# Patient Record
Sex: FEMALE | Race: ASIAN | Employment: STUDENT | URBAN - METROPOLITAN AREA
[De-identification: names, ages, dates, MRNs, and addresses within clinical notes are randomized per-mention and may not be internally consistent; named-entity substitution may affect disease eponyms.]

---
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
## 2021-02-08 PROBLEM — F41.1 GENERALIZED ANXIETY DISORDER: Status: ACTIVE | Noted: 2021-02-08

## 2021-08-05 ENCOUNTER — HOSPITAL ENCOUNTER (EMERGENCY)
Facility: HOSPITAL | Age: 21
Discharge: HOME | End: 2021-08-06
Attending: EMERGENCY MEDICINE
Payer: COMMERCIAL

## 2021-08-05 ENCOUNTER — APPOINTMENT (OUTPATIENT)
Dept: ULTRASOUND IMAGING | Facility: HOSPITAL | Age: 21
End: 2021-08-05
Attending: EMERGENCY MEDICINE
Payer: COMMERCIAL

## 2021-08-05 DIAGNOSIS — N93.8 DYSFUNCTIONAL UTERINE BLEEDING: Primary | ICD-10-CM

## 2021-08-05 LAB
ALBUMIN SERPL-MCNC: 4 G/DL (ref 3.4–5)
ALBUMIN/GLOB SERPL: 1.2 {RATIO} (ref 1–2)
ALP LIVER SERPL-CCNC: 56 U/L
ALT SERPL-CCNC: 25 U/L
ANION GAP SERPL CALC-SCNC: 7 MMOL/L (ref 0–18)
APTT PPP: 24.4 SECONDS (ref 25.4–36.1)
AST SERPL-CCNC: 15 U/L (ref 15–37)
B-HCG UR QL: NEGATIVE
BASOPHILS # BLD AUTO: 0.02 X10(3) UL (ref 0–0.2)
BASOPHILS NFR BLD AUTO: 0.3 %
BILIRUB SERPL-MCNC: 0.4 MG/DL (ref 0.1–2)
BILIRUB UR QL STRIP.AUTO: NEGATIVE
BUN BLD-MCNC: 5 MG/DL (ref 7–18)
CALCIUM BLD-MCNC: 8.9 MG/DL (ref 8.5–10.1)
CHLORIDE SERPL-SCNC: 106 MMOL/L (ref 98–112)
CLARITY UR REFRACT.AUTO: CLEAR
CO2 SERPL-SCNC: 23 MMOL/L (ref 21–32)
COLOR UR AUTO: YELLOW
CREAT BLD-MCNC: 0.71 MG/DL
EOSINOPHIL # BLD AUTO: 0.02 X10(3) UL (ref 0–0.7)
EOSINOPHIL NFR BLD AUTO: 0.3 %
ERYTHROCYTE [DISTWIDTH] IN BLOOD BY AUTOMATED COUNT: 12.8 %
GLOBULIN PLAS-MCNC: 3.3 G/DL (ref 2.8–4.4)
GLUCOSE BLD-MCNC: 73 MG/DL (ref 70–99)
GLUCOSE UR STRIP.AUTO-MCNC: NEGATIVE MG/DL
HCT VFR BLD AUTO: 37.5 %
HGB BLD-MCNC: 12.5 G/DL
HGB RETIC QN AUTO: 33.2 PG (ref 28.2–36.6)
IMM GRANULOCYTES # BLD AUTO: 0.01 X10(3) UL (ref 0–1)
IMM GRANULOCYTES NFR BLD: 0.2 %
IMM RETICS NFR: 0.04 RATIO (ref 0.1–0.3)
INR BLD: 1.08 (ref 0.89–1.11)
IRON SATURATION: 12 %
IRON SERPL-MCNC: 48 UG/DL
KETONES UR STRIP.AUTO-MCNC: 20 MG/DL
LEUKOCYTE ESTERASE UR QL STRIP.AUTO: NEGATIVE
LYMPHOCYTES # BLD AUTO: 3.25 X10(3) UL (ref 1–4)
LYMPHOCYTES NFR BLD AUTO: 56.3 %
M PROTEIN MFR SERPL ELPH: 7.3 G/DL (ref 6.4–8.2)
MCH RBC QN AUTO: 27.4 PG (ref 26–34)
MCHC RBC AUTO-ENTMCNC: 33.3 G/DL (ref 31–37)
MCV RBC AUTO: 82.2 FL
MONOCYTES # BLD AUTO: 0.54 X10(3) UL (ref 0.1–1)
MONOCYTES NFR BLD AUTO: 9.4 %
NEUTROPHILS # BLD AUTO: 1.93 X10 (3) UL (ref 1.5–7.7)
NEUTROPHILS # BLD AUTO: 1.93 X10(3) UL (ref 1.5–7.7)
NEUTROPHILS NFR BLD AUTO: 33.5 %
NITRITE UR QL STRIP.AUTO: NEGATIVE
OSMOLALITY SERPL CALC.SUM OF ELEC: 278 MOSM/KG (ref 275–295)
PH UR STRIP.AUTO: 6 [PH] (ref 5–8)
PLATELET # BLD AUTO: 252 10(3)UL (ref 150–450)
POTASSIUM SERPL-SCNC: 3.4 MMOL/L (ref 3.5–5.1)
PROT UR STRIP.AUTO-MCNC: NEGATIVE MG/DL
PSA SERPL DL<=0.01 NG/ML-MCNC: 14.2 SECONDS (ref 12.2–14.5)
RBC # BLD AUTO: 4.56 X10(6)UL
RETICS # AUTO: 39.7 X10(3) UL (ref 22.5–147.5)
RETICS/RBC NFR AUTO: 0.9 %
SODIUM SERPL-SCNC: 136 MMOL/L (ref 136–145)
SP GR UR STRIP.AUTO: 1.01 (ref 1–1.03)
TOTAL IRON BINDING CAPACITY: 417 UG/DL (ref 240–450)
TRANSFERRIN SERPL-MCNC: 280 MG/DL (ref 200–360)
UROBILINOGEN UR STRIP.AUTO-MCNC: <2 MG/DL
WBC # BLD AUTO: 5.8 X10(3) UL (ref 4–11)

## 2021-08-05 PROCEDURE — 99284 EMERGENCY DEPT VISIT MOD MDM: CPT

## 2021-08-05 PROCEDURE — 83550 IRON BINDING TEST: CPT | Performed by: EMERGENCY MEDICINE

## 2021-08-05 PROCEDURE — 81001 URINALYSIS AUTO W/SCOPE: CPT | Performed by: EMERGENCY MEDICINE

## 2021-08-05 PROCEDURE — 81025 URINE PREGNANCY TEST: CPT

## 2021-08-05 PROCEDURE — 96360 HYDRATION IV INFUSION INIT: CPT

## 2021-08-05 PROCEDURE — 85730 THROMBOPLASTIN TIME PARTIAL: CPT | Performed by: EMERGENCY MEDICINE

## 2021-08-05 PROCEDURE — 80053 COMPREHEN METABOLIC PANEL: CPT | Performed by: EMERGENCY MEDICINE

## 2021-08-05 PROCEDURE — 83540 ASSAY OF IRON: CPT | Performed by: EMERGENCY MEDICINE

## 2021-08-05 PROCEDURE — 85045 AUTOMATED RETICULOCYTE COUNT: CPT | Performed by: EMERGENCY MEDICINE

## 2021-08-05 PROCEDURE — 85610 PROTHROMBIN TIME: CPT | Performed by: EMERGENCY MEDICINE

## 2021-08-05 PROCEDURE — 85025 COMPLETE CBC W/AUTO DIFF WBC: CPT | Performed by: EMERGENCY MEDICINE

## 2021-08-05 RX ORDER — MELATONIN
325
Qty: 60 TABLET | Refills: 0 | Status: SHIPPED | OUTPATIENT
Start: 2021-08-05

## 2021-08-06 VITALS
SYSTOLIC BLOOD PRESSURE: 107 MMHG | RESPIRATION RATE: 16 BRPM | HEIGHT: 64 IN | OXYGEN SATURATION: 99 % | TEMPERATURE: 98 F | BODY MASS INDEX: 21 KG/M2 | DIASTOLIC BLOOD PRESSURE: 74 MMHG | HEART RATE: 66 BPM

## 2021-08-06 NOTE — ED PROVIDER NOTES
Patient Seen in: [facility] Emergency Department      History   Patient presents with:  Abdomen/Flank Pain    Stated Complaint: WEAKNESS, FATIGUE, MENSES X 1 MONTH    HPI/Subjective:   HPI    Patient is a 49-year-old says she has had daily vaginal b (Temporal)   Resp 16   Ht 162.6 cm (5' 4\")   LMP 06/27/2021   SpO2 99%   BMI 21.46 kg/m²         Physical Exam  GENERAL: Patient is awake, alert, active and interactive. HEENT: Head is normocephalic and atraumatic. Conjunctiva are clear.   No photophobia orders were created for panel order CBC With Differential With Platelet.   Procedure                               Abnormality         Status                     ---------                               -----------         ------                     CBC W/ D

## 2021-08-06 NOTE — ED INITIAL ASSESSMENT (HPI)
Switched birth control a month ago, has been on period for the last mo, fatigue/nausea/dizzy for the last week  Soaking approx 3 pads daily

## 2023-04-27 LAB — CYTOLOGY CVX/VAG DOC THIN PREP: NORMAL

## 2024-02-05 ENCOUNTER — TELEPHONE (OUTPATIENT)
Dept: BEHAVIORAL HEALTH | Age: 24
End: 2024-02-05

## 2024-02-09 ENCOUNTER — TELEPHONE (OUTPATIENT)
Dept: BEHAVIORAL HEALTH | Age: 24
End: 2024-02-09

## 2024-02-09 ENCOUNTER — E-ADVICE (OUTPATIENT)
Dept: BEHAVIORAL HEALTH | Age: 24
End: 2024-02-09

## 2024-02-09 ENCOUNTER — BEHAVIORAL HEALTH (OUTPATIENT)
Dept: BEHAVIORAL HEALTH | Age: 24
End: 2024-02-09

## 2024-02-09 DIAGNOSIS — Z86.59 HISTORY OF SUICIDAL IDEATION: ICD-10-CM

## 2024-02-09 DIAGNOSIS — R45.4 IRRITABILITY: ICD-10-CM

## 2024-02-09 DIAGNOSIS — Z91.52 HISTORY OF NON-SUICIDAL SELF-HARM: ICD-10-CM

## 2024-02-09 DIAGNOSIS — G47.11 IDIOPATHIC HYPERSOMNIA: ICD-10-CM

## 2024-02-09 DIAGNOSIS — F33.1 MODERATE EPISODE OF RECURRENT MAJOR DEPRESSIVE DISORDER (CMD): Primary | ICD-10-CM

## 2024-02-09 DIAGNOSIS — F41.1 GENERALIZED ANXIETY DISORDER: ICD-10-CM

## 2024-02-09 DIAGNOSIS — F40.10 SOCIAL ANXIETY DISORDER: ICD-10-CM

## 2024-02-09 DIAGNOSIS — F42.2 MIXED OBSESSIONAL THOUGHTS AND ACTS: ICD-10-CM

## 2024-02-09 DIAGNOSIS — F43.10 PTSD (POST-TRAUMATIC STRESS DISORDER): ICD-10-CM

## 2024-02-09 DIAGNOSIS — F39 MOOD DISORDER (CMD): ICD-10-CM

## 2024-02-09 RX ORDER — MODAFINIL 200 MG/1
200 TABLET ORAL DAILY
COMMUNITY
End: 2024-02-09 | Stop reason: SDUPTHER

## 2024-02-09 RX ORDER — MODAFINIL 200 MG/1
200 TABLET ORAL DAILY
Qty: 30 TABLET | Refills: 0 | Status: SHIPPED | OUTPATIENT
Start: 2024-02-09

## 2024-02-09 RX ORDER — FLUVOXAMINE MALEATE 50 MG/1
50 TABLET, COATED ORAL NIGHTLY
COMMUNITY

## 2024-02-26 ENCOUNTER — APPOINTMENT (OUTPATIENT)
Dept: BEHAVIORAL HEALTH | Age: 24
End: 2024-02-26

## 2024-02-27 ENCOUNTER — APPOINTMENT (OUTPATIENT)
Dept: BEHAVIORAL HEALTH | Age: 24
End: 2024-02-27

## 2024-02-27 DIAGNOSIS — F39 MOOD DISORDER (CMD): ICD-10-CM

## 2024-02-27 DIAGNOSIS — F43.10 PTSD (POST-TRAUMATIC STRESS DISORDER): ICD-10-CM

## 2024-02-27 DIAGNOSIS — F42.2 MIXED OBSESSIONAL THOUGHTS AND ACTS: ICD-10-CM

## 2024-02-27 DIAGNOSIS — F41.1 GENERALIZED ANXIETY DISORDER: Primary | ICD-10-CM

## 2024-02-27 DIAGNOSIS — F40.10 SOCIAL ANXIETY DISORDER: ICD-10-CM

## 2024-02-27 DIAGNOSIS — F33.1 MODERATE EPISODE OF RECURRENT MAJOR DEPRESSIVE DISORDER (CMD): ICD-10-CM

## 2024-02-27 DIAGNOSIS — G47.11 IDIOPATHIC HYPERSOMNIA: ICD-10-CM

## 2024-03-27 ENCOUNTER — APPOINTMENT (OUTPATIENT)
Dept: BEHAVIORAL HEALTH | Age: 24
End: 2024-03-27

## 2024-04-04 ENCOUNTER — LAB SERVICES (OUTPATIENT)
Dept: LAB | Age: 24
End: 2024-04-04

## 2024-04-04 ENCOUNTER — APPOINTMENT (OUTPATIENT)
Dept: INTERNAL MEDICINE | Age: 24
End: 2024-04-04

## 2024-04-04 VITALS
HEIGHT: 63 IN | RESPIRATION RATE: 16 BRPM | HEART RATE: 95 BPM | SYSTOLIC BLOOD PRESSURE: 118 MMHG | OXYGEN SATURATION: 98 % | TEMPERATURE: 98.1 F | BODY MASS INDEX: 19.84 KG/M2 | DIASTOLIC BLOOD PRESSURE: 74 MMHG | WEIGHT: 112 LBS

## 2024-04-04 DIAGNOSIS — M25.50 POLYARTHRALGIA: ICD-10-CM

## 2024-04-04 DIAGNOSIS — Z00.00 ANNUAL PHYSICAL EXAM: ICD-10-CM

## 2024-04-04 DIAGNOSIS — Z00.00 ANNUAL PHYSICAL EXAM: Primary | ICD-10-CM

## 2024-04-04 LAB
25(OH)D3+25(OH)D2 SERPL-MCNC: 20.9 NG/ML (ref 30–100)
ALBUMIN SERPL-MCNC: 4.1 G/DL (ref 3.6–5.1)
ALBUMIN/GLOB SERPL: 1.2 {RATIO} (ref 1–2.4)
ALP SERPL-CCNC: 57 UNITS/L (ref 45–117)
ALT SERPL-CCNC: 17 UNITS/L
ANION GAP SERPL CALC-SCNC: 14 MMOL/L (ref 7–19)
AST SERPL-CCNC: 15 UNITS/L
BASOPHILS # BLD: 0 K/MCL (ref 0–0.3)
BASOPHILS NFR BLD: 0 %
BILIRUB SERPL-MCNC: 0.4 MG/DL (ref 0.2–1)
BUN SERPL-MCNC: 12 MG/DL (ref 6–20)
BUN/CREAT SERPL: 18 (ref 7–25)
CALCIUM SERPL-MCNC: 9.3 MG/DL (ref 8.4–10.2)
CHLORIDE SERPL-SCNC: 102 MMOL/L (ref 97–110)
CHOLEST SERPL-MCNC: 151 MG/DL
CHOLEST/HDLC SERPL: 1.8 {RATIO}
CO2 SERPL-SCNC: 26 MMOL/L (ref 21–32)
CREAT SERPL-MCNC: 0.65 MG/DL (ref 0.51–0.95)
DEPRECATED RDW RBC: 42.2 FL (ref 39–50)
EGFRCR SERPLBLD CKD-EPI 2021: >90 ML/MIN/{1.73_M2}
EOSINOPHIL # BLD: 0 K/MCL (ref 0–0.5)
EOSINOPHIL NFR BLD: 0 %
ERYTHROCYTE [DISTWIDTH] IN BLOOD: 13.5 % (ref 11–15)
ERYTHROCYTE [SEDIMENTATION RATE] IN BLOOD BY WESTERGREN METHOD: 5 MM/HR (ref 0–20)
FASTING DURATION TIME PATIENT: NORMAL H
GLOBULIN SER-MCNC: 3.5 G/DL (ref 2–4)
GLUCOSE SERPL-MCNC: 72 MG/DL (ref 70–99)
HCT VFR BLD CALC: 40.8 % (ref 36–46.5)
HDLC SERPL-MCNC: 82 MG/DL
HGB BLD-MCNC: 13.3 G/DL (ref 12–15.5)
IMM GRANULOCYTES # BLD AUTO: 0 K/MCL (ref 0–0.2)
IMM GRANULOCYTES # BLD: 0 %
LDLC SERPL CALC-MCNC: 58 MG/DL
LYMPHOCYTES # BLD: 2.8 K/MCL (ref 1–4.8)
LYMPHOCYTES NFR BLD: 39 %
MCH RBC QN AUTO: 28.2 PG (ref 26–34)
MCHC RBC AUTO-ENTMCNC: 32.6 G/DL (ref 32–36.5)
MCV RBC AUTO: 86.4 FL (ref 78–100)
MONOCYTES # BLD: 0.6 K/MCL (ref 0.3–0.9)
MONOCYTES NFR BLD: 9 %
NEUTROPHILS # BLD: 3.7 K/MCL (ref 1.8–7.7)
NEUTROPHILS NFR BLD: 52 %
NONHDLC SERPL-MCNC: 69 MG/DL
NRBC BLD MANUAL-RTO: 0 /100 WBC
PLATELET # BLD AUTO: 316 K/MCL (ref 140–450)
POTASSIUM SERPL-SCNC: 4.2 MMOL/L (ref 3.4–5.1)
PROT SERPL-MCNC: 7.6 G/DL (ref 6.4–8.2)
RBC # BLD: 4.72 MIL/MCL (ref 4–5.2)
RHEUMATOID FACT SER NEPH-ACNC: <10 UNITS/ML
SODIUM SERPL-SCNC: 138 MMOL/L (ref 135–145)
TRIGL SERPL-MCNC: 56 MG/DL
TSH SERPL-ACNC: 1.2 MCUNITS/ML (ref 0.35–5)
WBC # BLD: 7.1 K/MCL (ref 4.2–11)

## 2024-04-04 PROCEDURE — 85652 RBC SED RATE AUTOMATED: CPT | Performed by: INTERNAL MEDICINE

## 2024-04-04 PROCEDURE — 36415 COLL VENOUS BLD VENIPUNCTURE: CPT | Performed by: INTERNAL MEDICINE

## 2024-04-04 PROCEDURE — 80050 GENERAL HEALTH PANEL: CPT | Performed by: INTERNAL MEDICINE

## 2024-04-04 PROCEDURE — 80061 LIPID PANEL: CPT | Performed by: INTERNAL MEDICINE

## 2024-04-04 PROCEDURE — 82306 VITAMIN D 25 HYDROXY: CPT | Performed by: INTERNAL MEDICINE

## 2024-04-04 PROCEDURE — 86431 RHEUMATOID FACTOR QUANT: CPT | Performed by: CLINICAL MEDICAL LABORATORY

## 2024-04-04 PROCEDURE — 86038 ANTINUCLEAR ANTIBODIES: CPT | Performed by: CLINICAL MEDICAL LABORATORY

## 2024-04-04 PROCEDURE — 86200 CCP ANTIBODY: CPT | Performed by: CLINICAL MEDICAL LABORATORY

## 2024-04-04 RX ORDER — PROPRANOLOL HYDROCHLORIDE 10 MG/1
TABLET ORAL
COMMUNITY

## 2024-04-04 RX ORDER — DESOGESTREL AND ETHINYL ESTRADIOL 0.15-0.03
1 KIT ORAL DAILY
COMMUNITY
Start: 2023-08-02 | End: 2024-10-25

## 2024-04-04 SDOH — HEALTH STABILITY: PHYSICAL HEALTH: ON AVERAGE, HOW MANY DAYS PER WEEK DO YOU ENGAGE IN MODERATE TO STRENUOUS EXERCISE (LIKE A BRISK WALK)?: 0 DAYS

## 2024-04-04 SDOH — HEALTH STABILITY: PHYSICAL HEALTH: ON AVERAGE, HOW MANY MINUTES DO YOU ENGAGE IN EXERCISE AT THIS LEVEL?: 0 MIN

## 2024-04-04 ASSESSMENT — PATIENT HEALTH QUESTIONNAIRE - PHQ9
SUM OF ALL RESPONSES TO PHQ9 QUESTIONS 1 AND 2: 4
1. LITTLE INTEREST OR PLEASURE IN DOING THINGS: MORE THAN HALF THE DAYS
CLINICAL INTERPRETATION OF PHQ9 SCORE: MODERATE DEPRESSION
SUM OF ALL RESPONSES TO PHQ QUESTIONS 1-9: 13
CLINICAL INTERPRETATION OF PHQ2 SCORE: FURTHER SCREENING NEEDED
2. FEELING DOWN, DEPRESSED OR HOPELESS: MORE THAN HALF THE DAYS
8. MOVING OR SPEAKING SO SLOWLY THAT OTHER PEOPLE COULD HAVE NOTICED. OR THE OPPOSITE, BEING SO FIGETY OR RESTLESS THAT YOU HAVE BEEN MOVING AROUND A LOT MORE THAN USUAL: NOT AT ALL
6. FEELING BAD ABOUT YOURSELF - OR THAT YOU ARE A FAILURE OR HAVE LET YOURSELF OR YOUR FAMILY DOWN: SEVERAL DAYS
3. TROUBLE FALLING OR STAYING ASLEEP OR SLEEPING TOO MUCH: SEVERAL DAYS
10. IF YOU CHECKED OFF ANY PROBLEMS, HOW DIFFICULT HAVE THESE PROBLEMS MADE IT FOR YOU TO DO YOUR WORK, TAKE CARE OF THINGS AT HOME, OR GET ALONG WITH OTHER PEOPLE: VERY DIFFICULT
7. TROUBLE CONCENTRATING ON THINGS, SUCH AS READING THE NEWSPAPER OR WATCHING TELEVISION: SEVERAL DAYS
4. FEELING TIRED OR HAVING LITTLE ENERGY: NEARLY EVERY DAY
5. POOR APPETITE, WEIGHT LOSS, OR OVEREATING: MORE THAN HALF THE DAYS
9. THOUGHTS THAT YOU WOULD BE BETTER OFF DEAD, OR OF HURTING YOURSELF: SEVERAL DAYS
SUM OF ALL RESPONSES TO PHQ9 QUESTIONS 1 AND 2: 4

## 2024-04-05 ENCOUNTER — E-ADVICE (OUTPATIENT)
Dept: INTERNAL MEDICINE | Age: 24
End: 2024-04-05

## 2024-04-05 DIAGNOSIS — E55.9 VITAMIN D DEFICIENCY: Primary | ICD-10-CM

## 2024-04-05 LAB
ANA SER QL IA: NEGATIVE
CCP AB SER IA-ACNC: 3 UNITS

## 2024-04-05 RX ORDER — ERGOCALCIFEROL 1.25 MG/1
1.25 CAPSULE ORAL
Qty: 12 CAPSULE | Refills: 0 | Status: SHIPPED | OUTPATIENT
Start: 2024-04-08 | End: 2024-07-01

## 2024-04-17 ENCOUNTER — TELEPHONE (OUTPATIENT)
Dept: BEHAVIORAL HEALTH | Age: 24
End: 2024-04-17

## 2024-04-17 ENCOUNTER — BEHAVIORAL HEALTH (OUTPATIENT)
Dept: BEHAVIORAL HEALTH | Age: 24
End: 2024-04-17

## 2024-04-17 ENCOUNTER — E-ADVICE (OUTPATIENT)
Dept: BEHAVIORAL HEALTH | Age: 24
End: 2024-04-17

## 2024-04-17 DIAGNOSIS — F39 MOOD DISORDER (CMD): ICD-10-CM

## 2024-04-17 DIAGNOSIS — F40.10 SOCIAL ANXIETY DISORDER: ICD-10-CM

## 2024-04-17 DIAGNOSIS — R45.4 IRRITABILITY: ICD-10-CM

## 2024-04-17 DIAGNOSIS — G47.11 IDIOPATHIC HYPERSOMNIA: ICD-10-CM

## 2024-04-17 DIAGNOSIS — F42.2 MIXED OBSESSIONAL THOUGHTS AND ACTS: Primary | ICD-10-CM

## 2024-04-17 DIAGNOSIS — F41.1 GENERALIZED ANXIETY DISORDER: ICD-10-CM

## 2024-04-17 DIAGNOSIS — F33.1 MODERATE EPISODE OF RECURRENT MAJOR DEPRESSIVE DISORDER (CMD): ICD-10-CM

## 2024-04-17 RX ORDER — MODAFINIL 200 MG/1
200 TABLET ORAL DAILY
Qty: 30 TABLET | Refills: 0 | Status: SHIPPED | OUTPATIENT
Start: 2024-04-17

## 2024-04-17 RX ORDER — FLUVOXAMINE MALEATE 25 MG
25 TABLET ORAL EVERY MORNING
Qty: 30 TABLET | Refills: 1 | Status: SHIPPED | OUTPATIENT
Start: 2024-04-17

## 2024-04-17 RX ORDER — FLUVOXAMINE MALEATE 50 MG/1
50 TABLET, COATED ORAL NIGHTLY
OUTPATIENT
Start: 2024-04-17

## 2024-04-17 RX ORDER — MODAFINIL 200 MG/1
200 TABLET ORAL DAILY
Qty: 30 TABLET | Refills: 0 | OUTPATIENT
Start: 2024-04-17

## 2024-04-17 RX ORDER — GABAPENTIN 400 MG/1
400 CAPSULE ORAL 3 TIMES DAILY
COMMUNITY

## 2024-04-17 RX ORDER — FLUVOXAMINE MALEATE 50 MG/1
50 TABLET, COATED ORAL NIGHTLY
Qty: 30 TABLET | Refills: 1 | Status: SHIPPED | OUTPATIENT
Start: 2024-04-17

## 2024-05-01 ENCOUNTER — APPOINTMENT (OUTPATIENT)
Dept: BEHAVIORAL HEALTH | Age: 24
End: 2024-05-01

## 2024-05-14 ENCOUNTER — TELEPHONE (OUTPATIENT)
Dept: BEHAVIORAL HEALTH | Age: 24
End: 2024-05-14

## 2024-05-23 DIAGNOSIS — F33.1 MODERATE EPISODE OF RECURRENT MAJOR DEPRESSIVE DISORDER  (CMD): ICD-10-CM

## 2024-05-23 DIAGNOSIS — F42.2 MIXED OBSESSIONAL THOUGHTS AND ACTS: ICD-10-CM

## 2024-05-23 DIAGNOSIS — F41.1 GENERALIZED ANXIETY DISORDER: ICD-10-CM

## 2024-05-23 DIAGNOSIS — G47.11 IDIOPATHIC HYPERSOMNIA: ICD-10-CM

## 2024-05-23 RX ORDER — FLUVOXAMINE MALEATE 25 MG
25 TABLET ORAL EVERY MORNING
Qty: 30 TABLET | Refills: 1 | Status: CANCELLED | OUTPATIENT
Start: 2024-05-23

## 2024-05-23 RX ORDER — MODAFINIL 200 MG/1
200 TABLET ORAL DAILY
Qty: 30 TABLET | Refills: 0 | Status: SHIPPED | OUTPATIENT
Start: 2024-05-23

## 2024-06-12 ENCOUNTER — E-ADVICE (OUTPATIENT)
Dept: BEHAVIORAL HEALTH | Age: 24
End: 2024-06-12

## 2024-06-12 ENCOUNTER — APPOINTMENT (OUTPATIENT)
Dept: BEHAVIORAL HEALTH | Age: 24
End: 2024-06-12

## 2024-06-12 DIAGNOSIS — F42.2 MIXED OBSESSIONAL THOUGHTS AND ACTS: Primary | ICD-10-CM

## 2024-06-12 DIAGNOSIS — F41.1 GENERALIZED ANXIETY DISORDER: ICD-10-CM

## 2024-06-12 DIAGNOSIS — F33.1 MODERATE EPISODE OF RECURRENT MAJOR DEPRESSIVE DISORDER  (CMD): ICD-10-CM

## 2024-06-12 DIAGNOSIS — F40.10 SOCIAL ANXIETY DISORDER: ICD-10-CM

## 2024-06-12 DIAGNOSIS — F43.10 PTSD (POST-TRAUMATIC STRESS DISORDER): ICD-10-CM

## 2024-06-12 DIAGNOSIS — G47.11 IDIOPATHIC HYPERSOMNIA: ICD-10-CM

## 2024-06-12 RX ORDER — MODAFINIL 200 MG/1
200 TABLET ORAL DAILY
Qty: 30 TABLET | Refills: 0 | Status: SHIPPED | OUTPATIENT
Start: 2024-06-21

## 2024-06-12 RX ORDER — FLUVOXAMINE MALEATE 50 MG/1
50 TABLET, COATED ORAL NIGHTLY
Qty: 90 TABLET | Refills: 0 | Status: SHIPPED | OUTPATIENT
Start: 2024-06-12

## 2024-06-12 RX ORDER — FLUVOXAMINE MALEATE 25 MG
25 TABLET ORAL EVERY MORNING
Qty: 90 TABLET | Refills: 0 | Status: SHIPPED | OUTPATIENT
Start: 2024-06-12

## 2024-07-10 ENCOUNTER — NURSE TRIAGE (OUTPATIENT)
Dept: SCHEDULING | Age: 24
End: 2024-07-10

## 2024-07-10 ENCOUNTER — E-ADVICE (OUTPATIENT)
Dept: BEHAVIORAL HEALTH | Age: 24
End: 2024-07-10

## 2024-07-10 ENCOUNTER — TELEPHONE (OUTPATIENT)
Dept: BEHAVIORAL HEALTH | Age: 24
End: 2024-07-10

## 2024-07-10 DIAGNOSIS — F41.1 GENERALIZED ANXIETY DISORDER: Primary | ICD-10-CM

## 2024-07-10 DIAGNOSIS — G47.11 IDIOPATHIC HYPERSOMNIA: ICD-10-CM

## 2024-07-10 DIAGNOSIS — F43.10 PTSD (POST-TRAUMATIC STRESS DISORDER): ICD-10-CM

## 2024-07-10 DIAGNOSIS — F33.1 MODERATE EPISODE OF RECURRENT MAJOR DEPRESSIVE DISORDER  (CMD): ICD-10-CM

## 2024-07-10 DIAGNOSIS — F42.2 MIXED OBSESSIONAL THOUGHTS AND ACTS: ICD-10-CM

## 2024-07-11 ENCOUNTER — TELEPHONE (OUTPATIENT)
Dept: BEHAVIORAL HEALTH | Age: 24
End: 2024-07-11

## 2024-07-12 ENCOUNTER — E-ADVICE (OUTPATIENT)
Dept: BEHAVIORAL HEALTH | Age: 24
End: 2024-07-12

## 2024-08-06 DIAGNOSIS — F41.1 GENERALIZED ANXIETY DISORDER: ICD-10-CM

## 2024-08-06 DIAGNOSIS — F42.2 MIXED OBSESSIONAL THOUGHTS AND ACTS: ICD-10-CM

## 2024-08-06 DIAGNOSIS — F33.1 MODERATE EPISODE OF RECURRENT MAJOR DEPRESSIVE DISORDER  (CMD): ICD-10-CM

## 2024-08-06 DIAGNOSIS — G47.11 IDIOPATHIC HYPERSOMNIA: ICD-10-CM

## 2024-08-06 RX ORDER — DESOGESTREL AND ETHINYL ESTRADIOL 0.15-0.03
1 KIT ORAL DAILY
Qty: 30 TABLET | Refills: 14 | Status: CANCELLED | OUTPATIENT
Start: 2024-08-06 | End: 2025-10-30

## 2024-08-06 RX ORDER — FLUVOXAMINE MALEATE 25 MG
TABLET ORAL
Qty: 90 TABLET | Refills: 0 | OUTPATIENT
Start: 2024-08-06

## 2024-08-06 RX ORDER — FLUVOXAMINE MALEATE 50 MG/1
TABLET, COATED ORAL
Qty: 90 TABLET | Refills: 0 | OUTPATIENT
Start: 2024-08-06

## 2024-08-06 RX ORDER — MODAFINIL 200 MG/1
200 TABLET ORAL DAILY
Qty: 30 TABLET | Refills: 0 | OUTPATIENT
Start: 2024-08-06

## 2024-08-07 ENCOUNTER — APPOINTMENT (OUTPATIENT)
Dept: BEHAVIORAL HEALTH | Age: 24
End: 2024-08-07

## 2024-08-07 RX ORDER — ERGOCALCIFEROL 1.25 MG/1
1.25 CAPSULE ORAL
Qty: 12 CAPSULE | Refills: 0 | OUTPATIENT
Start: 2024-08-07

## 2024-08-08 ENCOUNTER — BEHAVIORAL HEALTH (OUTPATIENT)
Dept: BEHAVIORAL HEALTH | Age: 24
End: 2024-08-08

## 2024-08-08 DIAGNOSIS — Z79.899 MEDICATION MANAGEMENT: ICD-10-CM

## 2024-08-08 DIAGNOSIS — G47.11 IDIOPATHIC HYPERSOMNIA: ICD-10-CM

## 2024-08-08 DIAGNOSIS — F39 MOOD DISORDER (CMD): ICD-10-CM

## 2024-08-08 DIAGNOSIS — F42.2 MIXED OBSESSIONAL THOUGHTS AND ACTS: ICD-10-CM

## 2024-08-08 DIAGNOSIS — F41.1 GENERALIZED ANXIETY DISORDER: Primary | ICD-10-CM

## 2024-08-08 DIAGNOSIS — F41.0 PANIC DISORDER: ICD-10-CM

## 2024-08-08 DIAGNOSIS — F40.10 SOCIAL ANXIETY DISORDER: ICD-10-CM

## 2024-08-08 DIAGNOSIS — F43.10 PTSD (POST-TRAUMATIC STRESS DISORDER): ICD-10-CM

## 2024-08-08 RX ORDER — MODAFINIL 200 MG/1
200 TABLET ORAL DAILY
Qty: 30 TABLET | OUTPATIENT
Start: 2024-08-08

## 2024-08-08 RX ORDER — GABAPENTIN 400 MG/1
400 CAPSULE ORAL 3 TIMES DAILY
Qty: 270 CAPSULE | Refills: 0 | Status: SHIPPED | OUTPATIENT
Start: 2024-08-08 | End: 2024-12-16 | Stop reason: SDUPTHER

## 2024-08-08 RX ORDER — MODAFINIL 200 MG/1
200 TABLET ORAL DAILY
Qty: 30 TABLET | Refills: 0 | Status: SHIPPED | OUTPATIENT
Start: 2024-08-08 | End: 2024-09-09 | Stop reason: SDUPTHER

## 2024-08-31 ENCOUNTER — APPOINTMENT (OUTPATIENT)
Dept: BEHAVIORAL HEALTH | Age: 24
End: 2024-08-31

## 2024-09-05 ENCOUNTER — APPOINTMENT (OUTPATIENT)
Dept: BEHAVIORAL HEALTH | Age: 24
End: 2024-09-05

## 2024-09-09 ENCOUNTER — APPOINTMENT (OUTPATIENT)
Dept: BEHAVIORAL HEALTH | Age: 24
End: 2024-09-09

## 2024-09-09 DIAGNOSIS — G47.11 IDIOPATHIC HYPERSOMNIA: ICD-10-CM

## 2024-09-09 DIAGNOSIS — F41.1 GENERALIZED ANXIETY DISORDER: ICD-10-CM

## 2024-09-09 DIAGNOSIS — F42.2 MIXED OBSESSIONAL THOUGHTS AND ACTS: ICD-10-CM

## 2024-09-09 DIAGNOSIS — F33.1 MODERATE EPISODE OF RECURRENT MAJOR DEPRESSIVE DISORDER  (CMD): ICD-10-CM

## 2024-09-09 RX ORDER — MODAFINIL 200 MG/1
200 TABLET ORAL DAILY
Qty: 30 TABLET | Refills: 0 | Status: SHIPPED | OUTPATIENT
Start: 2024-09-09 | End: 2024-09-13 | Stop reason: SDUPTHER

## 2024-09-09 RX ORDER — FLUVOXAMINE MALEATE 50 MG
50 TABLET ORAL NIGHTLY
Qty: 90 TABLET | Refills: 0 | Status: SHIPPED | OUTPATIENT
Start: 2024-09-09

## 2024-09-09 RX ORDER — FLUVOXAMINE MALEATE 25 MG
25 TABLET ORAL EVERY MORNING
Qty: 90 TABLET | Refills: 0 | Status: SHIPPED | OUTPATIENT
Start: 2024-09-09

## 2024-09-11 ENCOUNTER — E-ADVICE (OUTPATIENT)
Dept: BEHAVIORAL HEALTH | Age: 24
End: 2024-09-11

## 2024-09-12 ENCOUNTER — BEHAVIORAL HEALTH (OUTPATIENT)
Age: 24
End: 2024-09-12

## 2024-09-12 ENCOUNTER — APPOINTMENT (OUTPATIENT)
Dept: BEHAVIORAL HEALTH | Age: 24
End: 2024-09-12

## 2024-09-12 DIAGNOSIS — F33.1 MODERATE EPISODE OF RECURRENT MAJOR DEPRESSIVE DISORDER  (CMD): Primary | ICD-10-CM

## 2024-09-12 DIAGNOSIS — G47.11 IDIOPATHIC HYPERSOMNIA: ICD-10-CM

## 2024-09-12 DIAGNOSIS — F41.1 GENERALIZED ANXIETY DISORDER: ICD-10-CM

## 2024-09-12 DIAGNOSIS — F12.90 CANNABIS USE, UNCOMPLICATED: ICD-10-CM

## 2024-09-12 DIAGNOSIS — F42.2 MIXED OBSESSIONAL THOUGHTS AND ACTS: ICD-10-CM

## 2024-09-12 PROCEDURE — 90792 PSYCH DIAG EVAL W/MED SRVCS: CPT | Performed by: PSYCHIATRY & NEUROLOGY

## 2024-09-13 RX ORDER — MODAFINIL 200 MG/1
200 TABLET ORAL DAILY
Qty: 30 TABLET | Refills: 0 | Status: SHIPPED | OUTPATIENT
Start: 2024-09-13

## 2024-10-16 ENCOUNTER — CLINICAL ABSTRACT (OUTPATIENT)
Dept: FAMILY MEDICINE | Age: 24
End: 2024-10-16

## 2024-10-23 ENCOUNTER — TELEPHONE (OUTPATIENT)
Dept: BEHAVIORAL HEALTH | Age: 24
End: 2024-10-23

## 2024-11-09 DIAGNOSIS — G47.11 IDIOPATHIC HYPERSOMNIA: ICD-10-CM

## 2024-11-11 RX ORDER — MODAFINIL 200 MG/1
200 TABLET ORAL DAILY
Qty: 30 TABLET | Refills: 0 | Status: SHIPPED | OUTPATIENT
Start: 2024-11-11

## 2024-11-11 RX ORDER — MODAFINIL 200 MG/1
200 TABLET ORAL DAILY
Qty: 30 TABLET | OUTPATIENT
Start: 2024-11-11

## 2024-11-12 ENCOUNTER — BEHAVIORAL HEALTH (OUTPATIENT)
Age: 24
End: 2024-11-12

## 2024-11-12 DIAGNOSIS — F42.2 MIXED OBSESSIONAL THOUGHTS AND ACTS: ICD-10-CM

## 2024-11-12 DIAGNOSIS — F12.90 CANNABIS USE, UNCOMPLICATED: ICD-10-CM

## 2024-11-12 DIAGNOSIS — F41.1 GENERALIZED ANXIETY DISORDER: ICD-10-CM

## 2024-11-12 DIAGNOSIS — F33.1 MODERATE EPISODE OF RECURRENT MAJOR DEPRESSIVE DISORDER (CMD): Primary | ICD-10-CM

## 2024-11-12 DIAGNOSIS — G47.11 IDIOPATHIC HYPERSOMNIA: ICD-10-CM

## 2024-11-12 PROCEDURE — 99214 OFFICE O/P EST MOD 30 MIN: CPT | Performed by: PSYCHIATRY & NEUROLOGY

## 2024-12-04 ENCOUNTER — OFFICE VISIT (OUTPATIENT)
Dept: OBGYN | Age: 24
End: 2024-12-04

## 2024-12-04 VITALS
SYSTOLIC BLOOD PRESSURE: 96 MMHG | HEART RATE: 114 BPM | TEMPERATURE: 98.6 F | RESPIRATION RATE: 16 BRPM | OXYGEN SATURATION: 97 % | HEIGHT: 64 IN | WEIGHT: 110.6 LBS | BODY MASS INDEX: 18.88 KG/M2 | DIASTOLIC BLOOD PRESSURE: 64 MMHG

## 2024-12-04 DIAGNOSIS — Z30.011 INITIATION OF OCP (BCP): Primary | ICD-10-CM

## 2024-12-04 DIAGNOSIS — N92.6 IRREGULAR PERIODS: ICD-10-CM

## 2024-12-04 DIAGNOSIS — N94.6 DYSMENORRHEA: ICD-10-CM

## 2024-12-04 PROCEDURE — 99203 OFFICE O/P NEW LOW 30 MIN: CPT | Performed by: NURSE PRACTITIONER

## 2024-12-04 RX ORDER — DESOGESTREL AND ETHINYL ESTRADIOL 0.15-0.03
1 KIT ORAL DAILY
Qty: 84 TABLET | Refills: 3 | Status: SHIPPED | OUTPATIENT
Start: 2024-12-04

## 2024-12-04 SDOH — ECONOMIC STABILITY: HOUSING INSECURITY: WHAT IS YOUR LIVING SITUATION TODAY?: PATIENT DECLINED

## 2024-12-04 SDOH — ECONOMIC STABILITY: TRANSPORTATION INSECURITY
IN THE PAST 12 MONTHS, HAS LACK OF RELIABLE TRANSPORTATION KEPT YOU FROM MEDICAL APPOINTMENTS, MEETINGS, WORK OR FROM GETTING THINGS NEEDED FOR DAILY LIVING?: YES

## 2024-12-04 SDOH — ECONOMIC STABILITY: FOOD INSECURITY: WITHIN THE PAST 12 MONTHS, THE FOOD YOU BOUGHT JUST DIDN'T LAST AND YOU DIDN'T HAVE MONEY TO GET MORE.: PATIENT DECLINED

## 2024-12-04 SDOH — ECONOMIC STABILITY: FOOD INSECURITY: WITHIN THE PAST 12 MONTHS, THE FOOD YOU BOUGHT JUST DIDN'T LAST AND YOU DIDN'T HAVE MONEY TO GET MORE.: SOMETIMES TRUE

## 2024-12-04 SDOH — ECONOMIC STABILITY: HOUSING INSECURITY: DO YOU HAVE PROBLEMS WITH ANY OF THE FOLLOWING?: NONE OF THE ABOVE

## 2024-12-04 SDOH — ECONOMIC STABILITY: GENERAL: WOULD YOU LIKE HELP WITH ANY OF THE FOLLOWING NEEDS?: TRANSPORTATION

## 2024-12-04 ASSESSMENT — SOCIAL DETERMINANTS OF HEALTH (SDOH): IN THE PAST 12 MONTHS, HAS THE ELECTRIC, GAS, OIL, OR WATER COMPANY THREATENED TO SHUT OFF SERVICE IN YOUR HOME?: NO

## 2024-12-12 DIAGNOSIS — G47.11 IDIOPATHIC HYPERSOMNIA: ICD-10-CM

## 2024-12-13 DIAGNOSIS — G47.11 IDIOPATHIC HYPERSOMNIA: ICD-10-CM

## 2024-12-13 RX ORDER — MODAFINIL 200 MG/1
200 TABLET ORAL DAILY
Qty: 30 TABLET | Refills: 0 | Status: SHIPPED | OUTPATIENT
Start: 2024-12-13

## 2024-12-16 ENCOUNTER — APPOINTMENT (OUTPATIENT)
Age: 24
End: 2024-12-16

## 2024-12-16 DIAGNOSIS — G47.11 IDIOPATHIC HYPERSOMNIA: ICD-10-CM

## 2024-12-16 DIAGNOSIS — F33.1 MODERATE EPISODE OF RECURRENT MAJOR DEPRESSIVE DISORDER (CMD): Primary | ICD-10-CM

## 2024-12-16 DIAGNOSIS — F42.2 MIXED OBSESSIONAL THOUGHTS AND ACTS: ICD-10-CM

## 2024-12-16 DIAGNOSIS — F41.1 GENERALIZED ANXIETY DISORDER: ICD-10-CM

## 2024-12-16 PROCEDURE — 99214 OFFICE O/P EST MOD 30 MIN: CPT | Performed by: PSYCHIATRY & NEUROLOGY

## 2024-12-16 PROCEDURE — 90833 PSYTX W PT W E/M 30 MIN: CPT | Performed by: PSYCHIATRY & NEUROLOGY

## 2024-12-16 RX ORDER — MODAFINIL 200 MG/1
200 TABLET ORAL DAILY
Qty: 30 TABLET | OUTPATIENT
Start: 2024-12-16

## 2024-12-16 RX ORDER — GABAPENTIN 400 MG/1
400 CAPSULE ORAL 3 TIMES DAILY
Qty: 270 CAPSULE | Refills: 0 | Status: SHIPPED | OUTPATIENT
Start: 2024-12-16

## 2024-12-16 RX ORDER — MODAFINIL 200 MG/1
200 TABLET ORAL DAILY
Qty: 30 TABLET | Refills: 0 | Status: SHIPPED | OUTPATIENT
Start: 2024-12-16

## 2024-12-16 RX ORDER — FLUVOXAMINE MALEATE 50 MG
50 TABLET ORAL NIGHTLY
Qty: 90 TABLET | Refills: 0 | Status: SHIPPED | OUTPATIENT
Start: 2024-12-16

## 2024-12-16 RX ORDER — FLUVOXAMINE MALEATE 25 MG
25 TABLET ORAL EVERY MORNING
Qty: 90 TABLET | Refills: 0 | Status: SHIPPED | OUTPATIENT
Start: 2024-12-16

## 2024-12-19 ENCOUNTER — TELEPHONE (OUTPATIENT)
Age: 24
End: 2024-12-19

## 2024-12-23 ENCOUNTER — E-ADVICE (OUTPATIENT)
Dept: BEHAVIORAL HEALTH | Age: 24
End: 2024-12-23

## 2025-01-14 DIAGNOSIS — G47.11 IDIOPATHIC HYPERSOMNIA: ICD-10-CM

## 2025-01-14 RX ORDER — MODAFINIL 200 MG/1
200 TABLET ORAL DAILY
Qty: 30 TABLET | Refills: 0 | OUTPATIENT
Start: 2025-01-14

## 2025-02-20 ENCOUNTER — APPOINTMENT (OUTPATIENT)
Dept: INTERNAL MEDICINE | Age: 25
End: 2025-02-20

## 2025-02-20 VITALS
RESPIRATION RATE: 16 BRPM | SYSTOLIC BLOOD PRESSURE: 100 MMHG | OXYGEN SATURATION: 97 % | TEMPERATURE: 97.3 F | HEART RATE: 105 BPM | WEIGHT: 114.6 LBS | BODY MASS INDEX: 20.3 KG/M2 | DIASTOLIC BLOOD PRESSURE: 66 MMHG | HEIGHT: 63 IN

## 2025-02-20 DIAGNOSIS — R52 PAIN, GENERALIZED: Primary | ICD-10-CM

## 2025-02-20 DIAGNOSIS — M25.50 POLYARTHRALGIA: ICD-10-CM

## 2025-02-20 SDOH — HEALTH STABILITY: PHYSICAL HEALTH: ON AVERAGE, HOW MANY MINUTES DO YOU ENGAGE IN EXERCISE AT THIS LEVEL?: 60 MIN

## 2025-02-20 SDOH — HEALTH STABILITY: PHYSICAL HEALTH: ON AVERAGE, HOW MANY DAYS PER WEEK DO YOU ENGAGE IN MODERATE TO STRENUOUS EXERCISE (LIKE A BRISK WALK)?: 5 DAYS

## 2025-02-20 ASSESSMENT — PATIENT HEALTH QUESTIONNAIRE - PHQ9
2. FEELING DOWN, DEPRESSED OR HOPELESS: SEVERAL DAYS
CLINICAL INTERPRETATION OF PHQ2 SCORE: NO FURTHER SCREENING NEEDED
SUM OF ALL RESPONSES TO PHQ9 QUESTIONS 1 AND 2: 1
1. LITTLE INTEREST OR PLEASURE IN DOING THINGS: NOT AT ALL
SUM OF ALL RESPONSES TO PHQ9 QUESTIONS 1 AND 2: 1

## 2025-02-26 ENCOUNTER — HOSPITAL ENCOUNTER (EMERGENCY)
Facility: HOSPITAL | Age: 25
Discharge: HOME | End: 2025-02-26
Attending: EMERGENCY MEDICINE
Payer: MEDICAID

## 2025-02-26 VITALS
HEIGHT: 64 IN | TEMPERATURE: 97 F | DIASTOLIC BLOOD PRESSURE: 62 MMHG | WEIGHT: 115 LBS | RESPIRATION RATE: 16 BRPM | BODY MASS INDEX: 19.63 KG/M2 | OXYGEN SATURATION: 98 % | SYSTOLIC BLOOD PRESSURE: 103 MMHG | HEART RATE: 96 BPM

## 2025-02-26 DIAGNOSIS — K52.9 GASTROENTERITIS: Primary | ICD-10-CM

## 2025-02-26 LAB
ALBUMIN SERPL-MCNC: 5.1 G/DL (ref 3.2–4.8)
ALBUMIN/GLOB SERPL: 1.8 {RATIO} (ref 1–2)
ALP LIVER SERPL-CCNC: 58 U/L
ALT SERPL-CCNC: 13 U/L
ANION GAP SERPL CALC-SCNC: 5 MMOL/L (ref 0–18)
AST SERPL-CCNC: 16 U/L (ref ?–34)
BASOPHILS # BLD AUTO: 0.01 X10(3) UL (ref 0–0.2)
BASOPHILS NFR BLD AUTO: 0.1 %
BILIRUB SERPL-MCNC: 0.6 MG/DL (ref 0.3–1.2)
BUN BLD-MCNC: 11 MG/DL (ref 9–23)
CALCIUM BLD-MCNC: 9.3 MG/DL (ref 8.7–10.6)
CHLORIDE SERPL-SCNC: 106 MMOL/L (ref 98–112)
CO2 SERPL-SCNC: 25 MMOL/L (ref 21–32)
CREAT BLD-MCNC: 0.87 MG/DL
EGFRCR SERPLBLD CKD-EPI 2021: 95 ML/MIN/1.73M2 (ref 60–?)
EOSINOPHIL # BLD AUTO: 0 X10(3) UL (ref 0–0.7)
EOSINOPHIL NFR BLD AUTO: 0 %
ERYTHROCYTE [DISTWIDTH] IN BLOOD BY AUTOMATED COUNT: 12.4 %
GLOBULIN PLAS-MCNC: 2.8 G/DL (ref 2–3.5)
GLUCOSE BLD-MCNC: 110 MG/DL (ref 70–99)
HCG SERPL QL: NEGATIVE
HCT VFR BLD AUTO: 42.4 %
HGB BLD-MCNC: 14.2 G/DL
IMM GRANULOCYTES # BLD AUTO: 0.01 X10(3) UL (ref 0–1)
IMM GRANULOCYTES NFR BLD: 0.1 %
LIPASE SERPL-CCNC: 39 U/L (ref 12–53)
LYMPHOCYTES # BLD AUTO: 0.7 X10(3) UL (ref 1–4)
LYMPHOCYTES NFR BLD AUTO: 9.5 %
MCH RBC QN AUTO: 28.6 PG (ref 26–34)
MCHC RBC AUTO-ENTMCNC: 33.5 G/DL (ref 31–37)
MCV RBC AUTO: 85.5 FL
MONOCYTES # BLD AUTO: 0.48 X10(3) UL (ref 0.1–1)
MONOCYTES NFR BLD AUTO: 6.5 %
NEUTROPHILS # BLD AUTO: 6.18 X10 (3) UL (ref 1.5–7.7)
NEUTROPHILS # BLD AUTO: 6.18 X10(3) UL (ref 1.5–7.7)
NEUTROPHILS NFR BLD AUTO: 83.8 %
OSMOLALITY SERPL CALC.SUM OF ELEC: 282 MOSM/KG (ref 275–295)
PLATELET # BLD AUTO: 317 10(3)UL (ref 150–450)
POTASSIUM SERPL-SCNC: 3.7 MMOL/L (ref 3.5–5.1)
PROT SERPL-MCNC: 7.9 G/DL (ref 5.7–8.2)
RBC # BLD AUTO: 4.96 X10(6)UL
SODIUM SERPL-SCNC: 136 MMOL/L (ref 136–145)
WBC # BLD AUTO: 7.4 X10(3) UL (ref 4–11)

## 2025-02-26 PROCEDURE — 85025 COMPLETE CBC W/AUTO DIFF WBC: CPT

## 2025-02-26 PROCEDURE — 99284 EMERGENCY DEPT VISIT MOD MDM: CPT

## 2025-02-26 PROCEDURE — 84703 CHORIONIC GONADOTROPIN ASSAY: CPT | Performed by: EMERGENCY MEDICINE

## 2025-02-26 PROCEDURE — 96375 TX/PRO/DX INJ NEW DRUG ADDON: CPT

## 2025-02-26 PROCEDURE — 80053 COMPREHEN METABOLIC PANEL: CPT | Performed by: EMERGENCY MEDICINE

## 2025-02-26 PROCEDURE — 96374 THER/PROPH/DIAG INJ IV PUSH: CPT

## 2025-02-26 PROCEDURE — 83690 ASSAY OF LIPASE: CPT | Performed by: EMERGENCY MEDICINE

## 2025-02-26 PROCEDURE — S0028 INJECTION, FAMOTIDINE, 20 MG: HCPCS | Performed by: EMERGENCY MEDICINE

## 2025-02-26 PROCEDURE — 96361 HYDRATE IV INFUSION ADD-ON: CPT

## 2025-02-26 PROCEDURE — 85025 COMPLETE CBC W/AUTO DIFF WBC: CPT | Performed by: EMERGENCY MEDICINE

## 2025-02-26 PROCEDURE — 80053 COMPREHEN METABOLIC PANEL: CPT

## 2025-02-26 RX ORDER — VALSARTAN AND HYDROCHLOROTHIAZIDE 320; 12.5 MG/1; MG/1
25 TABLET, FILM COATED ORAL NIGHTLY
COMMUNITY

## 2025-02-26 RX ORDER — ONDANSETRON 4 MG/1
4 TABLET, ORALLY DISINTEGRATING ORAL EVERY 8 HOURS PRN
Qty: 9 TABLET | Refills: 0 | Status: SHIPPED | OUTPATIENT
Start: 2025-02-26 | End: 2025-03-01

## 2025-02-26 RX ORDER — DICYCLOMINE HYDROCHLORIDE 10 MG/1
10 CAPSULE ORAL 3 TIMES DAILY PRN
Qty: 15 CAPSULE | Refills: 0 | Status: SHIPPED | OUTPATIENT
Start: 2025-02-26 | End: 2025-03-03

## 2025-02-26 RX ORDER — METOCLOPRAMIDE HYDROCHLORIDE 5 MG/ML
10 INJECTION INTRAMUSCULAR; INTRAVENOUS ONCE
Status: COMPLETED | OUTPATIENT
Start: 2025-02-26 | End: 2025-02-26

## 2025-02-26 RX ORDER — DICYCLOMINE HYDROCHLORIDE 10 MG/1
10 CAPSULE ORAL ONCE
Status: COMPLETED | OUTPATIENT
Start: 2025-02-26 | End: 2025-02-26

## 2025-02-26 RX ORDER — FAMOTIDINE 10 MG/ML
20 INJECTION, SOLUTION INTRAVENOUS ONCE
Status: COMPLETED | OUTPATIENT
Start: 2025-02-26 | End: 2025-02-26

## 2025-02-26 RX ORDER — ONDANSETRON 2 MG/ML
4 INJECTION INTRAMUSCULAR; INTRAVENOUS ONCE
Status: COMPLETED | OUTPATIENT
Start: 2025-02-26 | End: 2025-02-26

## 2025-02-26 RX ORDER — NAPROXEN 250 MG/1
500 TABLET ORAL 2 TIMES DAILY PRN
Qty: 10 TABLET | Refills: 0 | Status: SHIPPED | OUTPATIENT
Start: 2025-02-26 | End: 2025-03-03

## 2025-02-26 RX ORDER — FAMOTIDINE 40 MG/1
40 TABLET, FILM COATED ORAL NIGHTLY
Qty: 14 TABLET | Refills: 0 | Status: SHIPPED | OUTPATIENT
Start: 2025-02-26 | End: 2025-03-12

## 2025-02-26 NOTE — ED INITIAL ASSESSMENT (HPI)
Pt to ER via wheelchair, states nausea vomiting and diarrhea \"nonstop\" since 0200. Family members at home sick with similar symptoms. Middle back pain 8/10. Pt appears pale 93/61 BP. Unknown fever, +chills.

## 2025-02-26 NOTE — ED QUICK NOTES
Patient went to [facility], had labs taken and given Zofran. Then left without being seen. Went home then came to our ED after taking dramamine and feeling out of it at home.

## 2025-02-27 NOTE — ED QUICK NOTES
Rounding Completed    Plan of Care reviewed. Waiting for fluids to be completed.  Elimination needs assessed.  Provided juice/saltines to pt and family.    Bed is locked and in lowest position. Call light within reach.

## 2025-02-27 NOTE — ED QUICK NOTES
Rounding Completed. Report received form TIESHA Gordillo    Plan of Care reviewed. Waiting for lab test/meds.  Elimination needs assessed.  Provided updates.    Bed is locked and in lowest position. Call light within reach.

## 2025-02-27 NOTE — ED PROVIDER NOTES
Patient Seen in: [facility] Emergency Department      History     Chief Complaint   Patient presents with    Abdomen/Flank Pain    Nausea/Vomiting/Diarrhea     Stated Complaint: abd pain, nvd    Subjective:   HPI      24-year-old female presenting with nausea, vomiting, diarrhea, abdominal pain and cramping for the past 24 hours.  Patient reports numerous episodes of nonbloody nonbilious emesis and nonbloody diarrhea.  Attempted to visit the outside ER, was in the waiting room and left before being seen due to prolonged wait time.  Multiple sick contacts with diarrheal illness as well.  No fevers, URI symptoms, cough, shortness of breath, urinary complaints.  Objective:     Past Medical History:    Anxiety    Bipolar 1 disorder (HCC)    Depression    High cholesterol    Mood disorder    no meds - 2018, no SI or HI    Psychiatric illness    Bi Polar    Thyroid disorder    resolved 2018              Past Surgical History:   Procedure Laterality Date    Remove tonsils/adenoids,<11 y/o  12/21/09    Performed by PAYAM MORE at [facility]                Social History     Socioeconomic History    Marital status: Single   Tobacco Use    Smoking status: Former    Smokeless tobacco: Never    Tobacco comments:     Currently vaping Juul pen daily    Vaping Use    Vaping status: Some Days   Substance and Sexual Activity    Alcohol use: Yes     Comment: 2-3 a month    Drug use: Yes     Types: Cannabis     Comment: smoke daily.    Sexual activity: Yes     Partners: Male     Birth control/protection: Pill     Social Drivers of Health     Food Insecurity: Unknown (12/4/2024)    Received from [facility]    Food Insecurity     Within the past 12 months, you worried that your food would run out before you got money to buy more.  : Patient declined     Within the past 12 months, the food you bought just didn't last and you didn't have money to get more. : Sometimes true   Transportation Needs: At  Risk (12/4/2024)    Received from [facility]    Transportation Needs     In the past 12 months, has lack of reliable transportation kept you from medical appointments, meetings, work or from getting things needed for daily living? : Yes                  Physical Exam     ED Triage Vitals [02/26/25 1553]   BP 98/66   Pulse 114   Resp 18   Temp 97 °F (36.1 °C)   Temp src Temporal   SpO2 97 %   O2 Device None (Room air)       Current Vitals:   Vital Signs  BP: 103/62  Pulse: 96  Resp: 16  Temp: 97 °F (36.1 °C)  Temp src: Temporal  MAP (mmHg): 75    Oxygen Therapy  SpO2: 98 %  O2 Device: None (Room air)        Physical Exam  Vitals and nursing note reviewed.   Constitutional:       General: She is not in acute distress.     Appearance: She is well-developed. She is not ill-appearing.   HENT:      Head: Normocephalic and atraumatic.   Eyes:      Extraocular Movements: Extraocular movements intact.      Pupils: Pupils are equal, round, and reactive to light.   Cardiovascular:      Rate and Rhythm: Normal rate and regular rhythm.      Pulses: Normal pulses.      Heart sounds: Normal heart sounds.   Pulmonary:      Effort: Pulmonary effort is normal. No respiratory distress.      Breath sounds: Normal breath sounds.   Abdominal:      General: Abdomen is flat. There is no distension.      Palpations: Abdomen is soft.      Tenderness: There is no abdominal tenderness.   Musculoskeletal:      Cervical back: Neck supple.   Skin:     General: Skin is dry.   Neurological:      Mental Status: She is alert and oriented to person, place, and time.   Psychiatric:         Mood and Affect: Mood normal.         Behavior: Behavior normal.             ED Course     Labs Reviewed   COMP METABOLIC PANEL (14) - Abnormal; Notable for the following components:       Result Value    Glucose 110 (*)     Albumin 5.1 (*)     All other components within normal limits   CBC WITH DIFFERENTIAL WITH PLATELET - Abnormal; Notable for the  following components:    Lymphocyte Absolute 0.70 (*)     All other components within normal limits   LIPASE - Normal   HCG, BETA SUBUNIT, QUAL - Normal   RAINBOW DRAW LAVENDER   RAINBOW DRAW LIGHT GREEN       ED Course as of 02/26/25 2306  ------------------------------------------------------------  Time: 02/26 2111  Comment: Patient tolerated liquids well.  Appropriate for discharge.              MDM              Medical Decision Making    Acute nausea, vomiting, diarrhea  Differential diagnosis includes gastroenteritis, enteritis, gastritis, small bowel obstruction, appendicitis  Patient's abdominal exam is benign, improved after Toradol  Patient tolerating p.o. after IV antiemetics and IV fluids  Presentation most consistent with gastroenteritis, return precautions given    Amount and/or Complexity of Data Reviewed  Labs: ordered. Decision-making details documented in ED Course.    Risk  OTC drugs.  Prescription drug management.        Disposition and Plan     Clinical Impression:  1. Gastroenteritis         Disposition:  Discharge  2/26/2025  9:13 pm    Follow-up:  Chicho Vega MD  [address] [place] [address]  [phone]    Schedule an appointment as soon as possible for a visit in 1 week(s)  make an appointment with your primary doctor or the assigned provider as needed if symptoms are unimproved.  You will need stool testing if diarrhea persist after 7 days.          Medications Prescribed:  Discharge Medication List as of 2/26/2025  9:25 PM        START taking these medications    Details   naproxen 250 MG Oral Tab Take 2 tablets (500 mg total) by mouth 2 (two) times daily as needed (pain)., Normal, Disp-10 tablet, R-0      ondansetron 4 MG Oral Tablet Dispersible Take 1 tablet (4 mg total) by mouth every 8 (eight) hours as needed for Nausea., Normal, Disp-9 tablet, R-0      famotidine 40 MG Oral Tab Take 1 tablet (40 mg total) by mouth at bedtime for 14 days., Normal, Disp-14  tablet, R-0      dicyclomine 10 MG Oral Cap Take 1 capsule (10 mg total) by mouth 3 (three) times daily as needed (abdominal cramping)., Normal, Disp-15 capsule, R-0                 Supplementary Documentation:

## 2025-03-12 ENCOUNTER — BEHAVIORAL HEALTH (OUTPATIENT)
Age: 25
End: 2025-03-12

## 2025-03-12 DIAGNOSIS — F33.0 MAJOR DEPRESSIVE DISORDER, RECURRENT EPISODE, MILD (CMD): ICD-10-CM

## 2025-03-12 DIAGNOSIS — F42.2 MIXED OBSESSIONAL THOUGHTS AND ACTS: ICD-10-CM

## 2025-03-12 DIAGNOSIS — G47.11 IDIOPATHIC HYPERSOMNIA: ICD-10-CM

## 2025-03-12 DIAGNOSIS — F12.90 CANNABIS USE, UNCOMPLICATED: ICD-10-CM

## 2025-03-12 DIAGNOSIS — F41.1 GENERALIZED ANXIETY DISORDER: Primary | ICD-10-CM

## 2025-03-12 PROCEDURE — 99214 OFFICE O/P EST MOD 30 MIN: CPT | Performed by: PSYCHIATRY & NEUROLOGY

## 2025-03-12 RX ORDER — FLUVOXAMINE MALEATE 25 MG
25 TABLET ORAL EVERY MORNING
Qty: 90 TABLET | Refills: 0 | Status: SHIPPED | OUTPATIENT
Start: 2025-03-12

## 2025-03-12 RX ORDER — MODAFINIL 200 MG/1
200 TABLET ORAL DAILY
Qty: 30 TABLET | Refills: 0 | Status: SHIPPED | OUTPATIENT
Start: 2025-03-12

## 2025-03-12 RX ORDER — GABAPENTIN 400 MG/1
400 CAPSULE ORAL 3 TIMES DAILY
Qty: 270 CAPSULE | Refills: 0 | Status: SHIPPED | OUTPATIENT
Start: 2025-03-12

## 2025-03-12 RX ORDER — FLUVOXAMINE MALEATE 50 MG
50 TABLET ORAL NIGHTLY
Qty: 90 TABLET | Refills: 0 | Status: SHIPPED | OUTPATIENT
Start: 2025-03-12

## 2025-03-29 DIAGNOSIS — N94.6 DYSMENORRHEA: ICD-10-CM

## 2025-03-29 DIAGNOSIS — N92.6 IRREGULAR PERIODS: ICD-10-CM

## 2025-03-29 DIAGNOSIS — Z30.011 INITIATION OF OCP (BCP): ICD-10-CM

## 2025-03-31 RX ORDER — DESOGESTREL AND ETHINYL ESTRADIOL 0.15-0.03
1 KIT ORAL DAILY
Qty: 84 TABLET | Refills: 3 | OUTPATIENT
Start: 2025-03-31

## 2025-04-14 DIAGNOSIS — G47.11 IDIOPATHIC HYPERSOMNIA: ICD-10-CM

## 2025-04-14 RX ORDER — MODAFINIL 200 MG/1
200 TABLET ORAL DAILY
Qty: 30 TABLET | Refills: 0 | Status: SHIPPED | OUTPATIENT
Start: 2025-04-14

## 2025-04-23 ENCOUNTER — HOSPITAL ENCOUNTER (OUTPATIENT)
Facility: HOSPITAL | Age: 25
Setting detail: OBSERVATION
Discharge: HOME | End: 2025-04-24
Attending: EMERGENCY MEDICINE | Admitting: HOSPITALIST
Payer: MEDICAID

## 2025-04-23 ENCOUNTER — APPOINTMENT (OUTPATIENT)
Dept: CT IMAGING | Facility: HOSPITAL | Age: 25
End: 2025-04-23
Attending: EMERGENCY MEDICINE
Payer: MEDICAID

## 2025-04-23 DIAGNOSIS — S22.41XA CLOSED FRACTURE OF MULTIPLE RIBS OF RIGHT SIDE, INITIAL ENCOUNTER: Primary | ICD-10-CM

## 2025-04-23 DIAGNOSIS — S27.0XXA TRAUMATIC PNEUMOTHORAX, INITIAL ENCOUNTER: ICD-10-CM

## 2025-04-23 DIAGNOSIS — F10.920 ALCOHOLIC INTOXICATION WITHOUT COMPLICATION: ICD-10-CM

## 2025-04-23 LAB
ALBUMIN SERPL-MCNC: 4.8 G/DL (ref 3.2–4.8)
ALBUMIN/GLOB SERPL: 1.8 {RATIO} (ref 1–2)
ALP LIVER SERPL-CCNC: 55 U/L (ref 37–98)
ALT SERPL-CCNC: 9 U/L (ref 10–49)
ANION GAP SERPL CALC-SCNC: 12 MMOL/L (ref 0–18)
AST SERPL-CCNC: 18 U/L (ref ?–34)
BASOPHILS # BLD AUTO: 0.03 X10(3) UL (ref 0–0.2)
BASOPHILS NFR BLD AUTO: 0.3 %
BILIRUB SERPL-MCNC: 0.2 MG/DL (ref 0.3–1.2)
BUN BLD-MCNC: 10 MG/DL (ref 9–23)
CALCIUM BLD-MCNC: 9.1 MG/DL (ref 8.7–10.6)
CHLORIDE SERPL-SCNC: 111 MMOL/L (ref 98–112)
CO2 SERPL-SCNC: 22 MMOL/L (ref 21–32)
CREAT BLD-MCNC: 0.74 MG/DL (ref 0.55–1.02)
EGFRCR SERPLBLD CKD-EPI 2021: 116 ML/MIN/1.73M2 (ref 60–?)
EOSINOPHIL # BLD AUTO: 0.02 X10(3) UL (ref 0–0.7)
EOSINOPHIL NFR BLD AUTO: 0.2 %
ERYTHROCYTE [DISTWIDTH] IN BLOOD BY AUTOMATED COUNT: 12.8 %
ETHANOL SERPL-MCNC: 244 MG/DL (ref ?–3)
GLOBULIN PLAS-MCNC: 2.6 G/DL (ref 2–3.5)
GLUCOSE BLD-MCNC: 118 MG/DL (ref 70–99)
HCT VFR BLD AUTO: 38.6 % (ref 35–48)
HGB BLD-MCNC: 12.9 G/DL (ref 12–16)
IMM GRANULOCYTES # BLD AUTO: 0.03 X10(3) UL (ref 0–1)
IMM GRANULOCYTES NFR BLD: 0.3 %
LYMPHOCYTES # BLD AUTO: 4.43 X10(3) UL (ref 1–4)
LYMPHOCYTES NFR BLD AUTO: 40.3 %
MCH RBC QN AUTO: 28 PG (ref 26–34)
MCHC RBC AUTO-ENTMCNC: 33.4 G/DL (ref 31–37)
MCV RBC AUTO: 83.9 FL (ref 80–100)
MONOCYTES # BLD AUTO: 0.71 X10(3) UL (ref 0.1–1)
MONOCYTES NFR BLD AUTO: 6.5 %
NEUTROPHILS # BLD AUTO: 5.77 X10 (3) UL (ref 1.5–7.7)
NEUTROPHILS # BLD AUTO: 5.77 X10(3) UL (ref 1.5–7.7)
NEUTROPHILS NFR BLD AUTO: 52.4 %
OSMOLALITY SERPL CALC.SUM OF ELEC: 300 MOSM/KG (ref 275–295)
PLATELET # BLD AUTO: 379 10(3)UL (ref 150–450)
POTASSIUM SERPL-SCNC: 3.7 MMOL/L (ref 3.5–5.1)
PROT SERPL-MCNC: 7.4 G/DL (ref 5.7–8.2)
RBC # BLD AUTO: 4.6 X10(6)UL (ref 3.8–5.3)
SODIUM SERPL-SCNC: 145 MMOL/L (ref 136–145)
WBC # BLD AUTO: 11 X10(3) UL (ref 4–11)

## 2025-04-23 PROCEDURE — 71250 CT THORAX DX C-: CPT | Performed by: EMERGENCY MEDICINE

## 2025-04-23 RX ORDER — MORPHINE SULFATE 4 MG/ML
4 INJECTION, SOLUTION INTRAMUSCULAR; INTRAVENOUS EVERY 30 MIN PRN
Status: DISCONTINUED | OUTPATIENT
Start: 2025-04-23 | End: 2025-04-24

## 2025-04-23 RX ORDER — KETOROLAC TROMETHAMINE 15 MG/ML
15 INJECTION, SOLUTION INTRAMUSCULAR; INTRAVENOUS ONCE
Status: COMPLETED | OUTPATIENT
Start: 2025-04-23 | End: 2025-04-23

## 2025-04-24 ENCOUNTER — WALK IN (OUTPATIENT)
Dept: URGENT CARE | Age: 25
End: 2025-04-24

## 2025-04-24 ENCOUNTER — RESULTS FOLLOW-UP (OUTPATIENT)
Dept: URGENT CARE | Age: 25
End: 2025-04-24

## 2025-04-24 ENCOUNTER — IMAGING SERVICES (OUTPATIENT)
Dept: GENERAL RADIOLOGY | Age: 25
End: 2025-04-24
Attending: INTERNAL MEDICINE

## 2025-04-24 VITALS
RESPIRATION RATE: 16 BRPM | OXYGEN SATURATION: 97 % | SYSTOLIC BLOOD PRESSURE: 100 MMHG | DIASTOLIC BLOOD PRESSURE: 58 MMHG | TEMPERATURE: 97 F | HEART RATE: 90 BPM

## 2025-04-24 VITALS
DIASTOLIC BLOOD PRESSURE: 54 MMHG | RESPIRATION RATE: 18 BRPM | OXYGEN SATURATION: 99 % | SYSTOLIC BLOOD PRESSURE: 96 MMHG | HEART RATE: 84 BPM | TEMPERATURE: 98.6 F

## 2025-04-24 DIAGNOSIS — R52 PAIN: Primary | ICD-10-CM

## 2025-04-24 DIAGNOSIS — S22.41XA CLOSED FRACTURE OF MULTIPLE RIBS OF RIGHT SIDE, INITIAL ENCOUNTER: ICD-10-CM

## 2025-04-24 DIAGNOSIS — R52 PAIN: ICD-10-CM

## 2025-04-24 PROBLEM — F10.920 ALCOHOLIC INTOXICATION WITHOUT COMPLICATION: Status: ACTIVE | Noted: 2025-04-24

## 2025-04-24 PROBLEM — S27.0XXA TRAUMATIC PNEUMOTHORAX, INITIAL ENCOUNTER: Status: ACTIVE | Noted: 2025-04-24

## 2025-04-24 PROCEDURE — 99223 1ST HOSP IP/OBS HIGH 75: CPT | Performed by: INTERNAL MEDICINE

## 2025-04-24 PROCEDURE — 99223 1ST HOSP IP/OBS HIGH 75: CPT | Performed by: HOSPITALIST

## 2025-04-24 PROCEDURE — 71046 X-RAY EXAM CHEST 2 VIEWS: CPT | Performed by: RADIOLOGY

## 2025-04-24 PROCEDURE — 99214 OFFICE O/P EST MOD 30 MIN: CPT | Performed by: INTERNAL MEDICINE

## 2025-04-24 RX ORDER — PROPRANOLOL HYDROCHLORIDE 10 MG/1
10 TABLET ORAL 3 TIMES DAILY
Status: DISCONTINUED | OUTPATIENT
Start: 2025-04-24 | End: 2025-04-24

## 2025-04-24 RX ORDER — MODAFINIL 100 MG/1
200 TABLET ORAL DAILY
Status: DISCONTINUED | OUTPATIENT
Start: 2025-04-24 | End: 2025-04-24

## 2025-04-24 RX ORDER — MORPHINE SULFATE 4 MG/ML
4 INJECTION, SOLUTION INTRAMUSCULAR; INTRAVENOUS EVERY 2 HOUR PRN
Status: DISCONTINUED | OUTPATIENT
Start: 2025-04-24 | End: 2025-04-24

## 2025-04-24 RX ORDER — REPAGLINIDE 0.5 MG/1
50 TABLET ORAL NIGHTLY
Status: DISCONTINUED | OUTPATIENT
Start: 2025-04-24 | End: 2025-04-24

## 2025-04-24 RX ORDER — HYDROCODONE BITARTRATE AND ACETAMINOPHEN 5; 325 MG/1; MG/1
1 TABLET ORAL EVERY 6 HOURS PRN
Refills: 0 | Status: DISCONTINUED | OUTPATIENT
Start: 2025-04-24 | End: 2025-04-24

## 2025-04-24 RX ORDER — SODIUM PHOSPHATE, DIBASIC AND SODIUM PHOSPHATE, MONOBASIC 7; 19 G/230ML; G/230ML
1 ENEMA RECTAL ONCE AS NEEDED
Status: DISCONTINUED | OUTPATIENT
Start: 2025-04-24 | End: 2025-04-24

## 2025-04-24 RX ORDER — ONDANSETRON 2 MG/ML
4 INJECTION INTRAMUSCULAR; INTRAVENOUS EVERY 6 HOURS PRN
Status: DISCONTINUED | OUTPATIENT
Start: 2025-04-24 | End: 2025-04-24

## 2025-04-24 RX ORDER — ACETAMINOPHEN 500 MG
500 TABLET ORAL EVERY 4 HOURS PRN
Status: DISCONTINUED | OUTPATIENT
Start: 2025-04-24 | End: 2025-04-24

## 2025-04-24 RX ORDER — SENNOSIDES 8.6 MG
17.2 TABLET ORAL NIGHTLY PRN
Status: DISCONTINUED | OUTPATIENT
Start: 2025-04-24 | End: 2025-04-24

## 2025-04-24 RX ORDER — POLYETHYLENE GLYCOL 3350 17 G/17G
17 POWDER, FOR SOLUTION ORAL DAILY PRN
Status: DISCONTINUED | OUTPATIENT
Start: 2025-04-24 | End: 2025-04-24

## 2025-04-24 RX ORDER — ARIPIPRAZOLE 2 MG/1
2 TABLET ORAL DAILY
Status: DISCONTINUED | OUTPATIENT
Start: 2025-04-24 | End: 2025-04-24

## 2025-04-24 RX ORDER — PROPRANOLOL HYDROCHLORIDE 10 MG/1
10 TABLET ORAL 3 TIMES DAILY PRN
Status: DISCONTINUED | OUTPATIENT
Start: 2025-04-24 | End: 2025-04-24

## 2025-04-24 RX ORDER — BISACODYL 10 MG
10 SUPPOSITORY, RECTAL RECTAL
Status: DISCONTINUED | OUTPATIENT
Start: 2025-04-24 | End: 2025-04-24

## 2025-04-24 RX ORDER — MORPHINE SULFATE 2 MG/ML
2 INJECTION, SOLUTION INTRAMUSCULAR; INTRAVENOUS EVERY 2 HOUR PRN
Status: DISCONTINUED | OUTPATIENT
Start: 2025-04-24 | End: 2025-04-24

## 2025-04-24 RX ORDER — KETOROLAC TROMETHAMINE 30 MG/ML
15 INJECTION, SOLUTION INTRAMUSCULAR; INTRAVENOUS EVERY 6 HOURS PRN
Status: DISCONTINUED | OUTPATIENT
Start: 2025-04-24 | End: 2025-04-24

## 2025-04-24 RX ORDER — PROCHLORPERAZINE EDISYLATE 5 MG/ML
5 INJECTION INTRAMUSCULAR; INTRAVENOUS EVERY 8 HOURS PRN
Status: DISCONTINUED | OUTPATIENT
Start: 2025-04-24 | End: 2025-04-24

## 2025-04-24 RX ORDER — MORPHINE SULFATE 2 MG/ML
1 INJECTION, SOLUTION INTRAMUSCULAR; INTRAVENOUS EVERY 2 HOUR PRN
Status: DISCONTINUED | OUTPATIENT
Start: 2025-04-24 | End: 2025-04-24

## 2025-04-24 RX ORDER — ENOXAPARIN SODIUM 100 MG/ML
40 INJECTION SUBCUTANEOUS DAILY
Status: DISCONTINUED | OUTPATIENT
Start: 2025-04-24 | End: 2025-04-24

## 2025-04-24 RX ORDER — REPAGLINIDE 0.5 MG/1
25 TABLET ORAL DAILY
Status: DISCONTINUED | OUTPATIENT
Start: 2025-04-24 | End: 2025-04-24

## 2025-04-24 NOTE — DISCHARGE SUMMARY
[facility]     Shayna Oliver Patient Status:  Observation    10/4/2000 MRN [id number]   Location [facility] 3SW-A Attending No att. providers found   Hosp Day # 0 PCP SPARKLE CAMPBELL     Date of Admission: 2025  Date of Discharge: 2025  Discharge Disposition: AMA    Discharge Diagnosis:   #Right pneumothorax  #Right ninth and tenth rib fracture  #ETOH intoxication, resolved  #Bipolar disorder  #Anxiety  #Sleeping disorder    History of Present Illness: Shayna Oliver is a 24 year old female with hx of bipolar disorder had been out drinking alcohol and marajuana and fell on her rt side. No consciousness and did not hit her head.  After the fall patient complaining of right-sided chest pain. No shortness of breath. No fevers, chills, nausea, vomiting, diarrhea.     Brief Synopsis: Patient found to have right pneumothorax, likely traumatic from fall and right rib fractures.  She was started on supplemental oxygen.  Pulmonology consulted and recommended she stay in the hospital but she wanted to leave AMA. She understands the risks of leaving AMA including end organ damage and even death. Mother at bedside during conversation. Did discus with patient and mother to seek care immediately if she has pain, SOB or does not feel well for any reason. Mother is taking day off work tomorrow. Recommended she follow up with her PCP ASAP. They both express understanding and were grateful for my recommendations.     Lace+ Score: 49  59-90 High Risk  29-58 Medium Risk  0-28   Low Risk       TCM Follow-Up Recommendation:  LACE 29-58: Moderate Risk of readmission after discharge from the hospital.    Procedures during hospitalization:   None    Incidental or significant findings and recommendations (brief descriptions):  Please see brief synopsis above    Lab/Test results pending at Discharge:   None    Consultants:  Pulmonology    Discharge Medication List:     Discharge  Medications        CONTINUE taking these medications        Instructions Prescription details   Desogestrel-Ethinyl Estradiol 0.15-30 MG-MCG Tabs  Commonly known as: Desogen      Take 1 tablet by mouth daily.   Quantity: 90 tablet  Refills: 2     fluvoxaMINE Maleate 25 MG Tabs  Commonly known as: LUVOX      Take 1 tablet (25 mg total) by mouth nightly. 25mg AM, 50mg HS   Refills: 0     modafinil 200 MG Tabs  Commonly known as: PROVIGIL      Take 1 tablet (200 mg total) by mouth daily.   Quantity: 30 tablet  Refills: 0     propranolol 10 MG Tabs  Commonly known as: Inderal       Refills: 0            STOP taking these medications      albuterol 108 (90 Base) MCG/ACT Aers  Commonly known as: Ventolin HFA        ARIPiprazole 2 MG Tabs  Commonly known as: Abilify        dicyclomine 10 MG Caps  Commonly known as: Bentyl        famotidine 40 MG Tabs  Commonly known as: Pepcid        ferrous sulfate 325 (65 FE) MG Tbec        Gabapentin 300 MG/6ML Soln        naproxen 250 MG Tabs  Commonly known as: Naprosyn        omeprazole 20 MG Cpdr  Commonly known as: PriLOSEC        ondansetron 4 MG Tbdp  Commonly known as: Zofran-ODT                 [place] substances prescribed at discharge.    Follow-up appointment:   No follow-up provider specified.  Appointments for Next 30 Days 4/24/2025 - 5/24/2025      None            Vital signs:  Temp:  [97.2 °F (36.2 °C)-98.5 °F (36.9 °C)] 97.2 °F (36.2 °C)  Pulse:  [70-97] 90  Resp:  [16-26] 16  BP: ()/(36-62) 100/58  SpO2:  [97 %-100 %] 97 %    Physical Exam:    See progress note dated same day.  -----------------------------------------------------------------------------------------------  PATIENT DISCHARGE INSTRUCTIONS: See electronic chart    Epifanio Fitzpatrick DO    Time spent:  35 minutes

## 2025-04-24 NOTE — PLAN OF CARE
A&Ox4. Hypotensive - hospitalist aware, no new orders. On 2L O2 via NC. /IS. Telemetry monitoring. Tolerating diet. Last BM 4/23. Voiding. Pulm to see. Patient updated and in agreement with plan of care. Safety precautions in place. Instructed patient to call for assistance, call light within reach.

## 2025-04-24 NOTE — CONSULTS
[facility] Pulmonary Consult Note       Shayna Oliver Patient Status:  Observation    10/4/2000 MRN [id number]   Location [facility] 3SW-A Attending Epifanio Fitzpatrick,    Hosp Day # 0 PCP SPARKLE CAMPBELL     Reason for Consultation:  pneumothorax    History of Present Illness:  Shayna Oliver is a a(n) 24 year old female with no signficiant PMH who presents after having a fall after being intoxicated.  Work up in the ER showed a small right pnuemothoax and 9th/10th rib fracture.  Patient has remote smoking history, currently smoking marijuana.    History:  Past Medical History[1]  Past Surgical History[2]  Family History[3]   reports that she has quit smoking. She has never used smokeless tobacco. She reports current alcohol use. She reports current drug use. Drug: Cannabis.    Allergies:  Allergies[4]    Medications:  Current Hospital Medications[5]    Review of Systems:   All other review of systems are negative.    Vital signs in last 24 hours:  Patient Vitals for the past 24 hrs:   BP Temp Temp src Pulse Resp SpO2   25 1121 100/58 97.2 °F (36.2 °C) Oral 90 16 97 %   25 1014 -- -- -- 70 -- --   25 0822 (!) 81/36 98.5 °F (36.9 °C) Oral 91 16 99 %   25 0224 -- -- -- 86 -- 99 %   25 0154 95/59 98 °F (36.7 °C) Oral 86 16 100 %   25 0100 99/61 -- -- 81 23 100 %   25 0030 100/62 -- -- 87 20 100 %   25 2315 -- -- -- 96 26 99 %   25 2233 -- -- -- -- -- 100 %   25 2224 99/62 97.8 °F (36.6 °C) Oral 97 18 100 %       Intake/Output:    Intake/Output Summary (Last 24 hours) at 2025 1204  Last data filed at 2025 1014  Gross per 24 hour   Intake 240 ml   Output 1 ml   Net 239 ml       Physical Exam:   General: alert, cooperative, oriented.  No respiratory distress.   Head: Normocephalic, without obvious abnormality, atraumatic.   Eyes: Conjunctivae/corneas clear.  No scleral icterus.  No conjunctival     hemorrhage.   Nose: Nares  normal.   Throat: Lips, mucosa, and tongue normal.  No thrush noted.   Neck: Soft, supple neck; trachea midline, no adenopathy, no thyromegaly.   Lungs: clear to auscultation bilaterally   Chest wall: No tenderness or deformity.   Heart: Regular rate and rhythm, normal S1S2, no murmur.   Abdomen: soft, non-tender, non-distended, no masses, no guarding, no     rebound, positive BS.   Extremity: no edema, no cyanosis   Skin: No rashes or lesions.   Neurological: Alert, interactive, no focal deficits    Lab Data Review:  Recent Labs   Lab 04/23/25  2225   WBC 11.0   HGB 12.9   HCT 38.6   .0       Recent Labs   Lab 04/23/25  2225      K 3.7      CO2 22.0   BUN 10   ALT 9*   AST 18       No results for input(s): \"MG\" in the last 168 hours.    No results found for: \"PHOS\", \"PHOSPHORUS\"     No results for input(s): \"PT\", \"INR\", \"PTT\" in the last 168 hours.    No results for input(s): \"ABGPHT\", \"SUDMEU6S\", \"BZJYY8N\", \"ABGHCO3\", \"ABGBE\", \"TEMP\", \"RASHAUN\", \"SITE\", \"DEV\", \"THGB\" in the last 168 hours.    Invalid input(s): \"BHP30NOU\", \"CHOB\"    Invalid input(s): \"CKTOTAL\", \"TROPONINI\", \"TROPONINT\", \"CKMBINDEX\"      Cultures:   No results found for this visit on 04/23/25.    Radiology:  CT CHEST (JPE=10031)  Narrative: PROCEDURE:  CT CHEST (CPT=71250)     COMPARISON:  None.     INDICATIONS:  fall, etoh, pain     TECHNIQUE:  Unenhanced multislice CT scanning is performed through the chest.  Dose reduction techniques were used. Dose information is transmitted to the ACR (American College of Radiology) NRDR (National Radiology Data Registry) which includes the Dose   Index Registry.     PATIENT STATED HISTORY: (As transcribed by Technologist)  fall, left rib pain, hx of fibromyalgia         FINDINGS:  Please note that evaluation of the chest is limited without intravenous contrast.  LUNGS:  Small calcified granulomas are present.  VASCULATURE:  Pulmonary vessels are unremarkable within the limits of a noncontrast  CT.    PATRICK:  No mass or adenopathy.    MEDIASTINUM:  Calcified mediastinal lymph nodes are noted.  CARDIAC:  No enlargement, pericardial thickening, or significant coronary artery calcification.  PLEURA:  A small right pneumothorax is present.  No evidence of mediastinal shift.  No significant effusion seen.  THORACIC AORTA:  Unremarkable as seen on non-contrast imaging.   CHEST WALL:  Subtle nondisplaced fractures are present involving the posterior right ninth and tenth ribs.  LIMITED ABDOMEN:  Limited images of the upper abdomen are unremarkable.    BONES:  No bony lesion or fracture.                     Impression: CONCLUSION:  Small right pneumothorax.     Nondisplaced fractures of the right posterior ninth and tenth ribs.     Critical results were discussed with Dr. Taylor by Dr. Cruz at approximately 2338 on 4/23/25.  Read back was performed.        LOCATION:  [facility]        Dictated by (CST): Toi Cruz MD on 4/23/2025 at 11:34 PM       Finalized by (CST): Toi Cruz MD on 4/23/2025 at 11:39 PM         Assessment:  Right sided traumatic pneumothorax  9th/10th rib fracture  Etoh Abuse  Bipolar disorder  Anxiety  Sleeping disorder      Plan:  Reviewed CT scans with patient and mother  I recommended she give it another day to make sure ptx is not expanding but patient is adamant that she wants to go home  Recommend IS 10x/hour, pain control, and fu CXR in 1 week at their own disecretion  I explained that an expanding pneumothorax may happen suddenly and she may not have time to seek medical attention and that she can die from a tension pneumothorax  I gave precuations of when to call 911 including but not limited to increasing inspiratory pain or discomfort   Will follow if in still in hospital if patient changes her mind  D/w hospitalist    Edson Goodman MD  [facility]  Office: [phone]         [1]   Past Medical History:   Anxiety    Bipolar 1 disorder (HCC)    Depression     High cholesterol    Mood disorder    no meds - 2018, no SI or HI    Psychiatric illness    Bi Polar    Thyroid disorder    resolved 2018   [2]   Past Surgical History:  Procedure Laterality Date    Remove tonsils/adenoids,<11 y/o  12/21/09    Performed by PAYAM MORE at [facility]   [3]   Family History  Problem Relation Age of Onset    Hypertension Maternal Grandfather     High Blood Pressure Maternal Grandfather     Heart Disease Maternal Grandfather         enlarged heart    Kidney Disease Maternal Grandfather         cysts on kidneys    Obesity Mother     Depression Mother     Schizophrenia Mother         schizoaffective disorder    Heart Disease Paternal Grandmother         arrhythmia    High Blood Pressure Paternal Grandmother     High Cholesterol Paternal Grandmother     Diabetes Paternal Grandmother     Heart Surgery Maternal Grandfather         and CHF    Other (narcoleepsy) Sister    [4]   Allergies  Allergen Reactions    Omnicef [Cefdinir] SWELLING    Azithromycin OTHER (SEE COMMENTS)     Lost feeling in bilat legs for one week    Veramyst SWELLING   [5]   Current Facility-Administered Medications:     modafinil (Provigil) tab 200 mg, 200 mg, Oral, Daily    melatonin tab 3 mg, 3 mg, Oral, Nightly PRN    enoxaparin (Lovenox) 40 MG/0.4ML SUBQ injection 40 mg, 40 mg, Subcutaneous, Daily    morphINE PF 2 MG/ML injection 1 mg, 1 mg, Intravenous, Q2H PRN **OR** morphINE PF 2 MG/ML injection 2 mg, 2 mg, Intravenous, Q2H PRN **OR** morphINE PF 4 MG/ML injection 4 mg, 4 mg, Intravenous, Q2H PRN    ondansetron (Zofran) 4 MG/2ML injection 4 mg, 4 mg, Intravenous, Q6H PRN    prochlorperazine (Compazine) 10 MG/2ML injection 5 mg, 5 mg, Intravenous, Q8H PRN    polyethylene glycol (PEG 3350) (Miralax) 17 g oral packet 17 g, 17 g, Oral, Daily PRN    sennosides (Senokot) tab 17.2 mg, 17.2 mg, Oral, Nightly PRN    bisacodyl (Dulcolax) 10 MG rectal suppository 10 mg, 10 mg, Rectal, Daily PRN    fleet enema  (Fleet) rectal enema 133 mL, 1 enema, Rectal, Once PRN    HYDROcodone-acetaminophen (Norco) 5-325 MG per tab 1 tablet, 1 tablet, Oral, Q6H PRN    lidocaine-menthol 4-1 % patch 1 patch, 1 patch, Transdermal, Daily PRN    acetaminophen (Tylenol Extra Strength) tab 500 mg, 500 mg, Oral, Q4H PRN    ketorolac (Toradol) 30 MG/ML injection 15 mg, 15 mg, Intravenous, Q6H PRN    propranolol (Inderal) tab 10 mg, 10 mg, Oral, TID PRN    fluvoxaMINE (Luvox) tab 50 mg, 50 mg, Oral, Nightly    fluvoxaMINE (Luvox) tab 25 mg, 25 mg, Oral, Daily

## 2025-04-24 NOTE — ED INITIAL ASSESSMENT (HPI)
Patient to the ER via EMS. Patient arrives saying she hurts and help me. Hx of fibromyalgia and depression/anxiety. Patient fell at home onto a medal bar attached to punching bag. Several hours after falling she had a panic attack and screaming to mom I can't breathe. Left side ribs hurt. C collar on. No LOC. Patient has been drinking and smoking marijuana

## 2025-04-24 NOTE — H&P
[facility]     Shayna Oliver Patient Status:  Emergency    10/4/2000 MRN [id number]   Location [facility] EMERGENCY DEPARTMENT Attending Jesse Taylor MD   Hosp Day # 0 PCP SPARKLE CAMPBELL     Chief Complaint: Fall    Subjective:    History of Present Illness:     Shayna Oliver is a 24 year old female with hx of bipolar disorder had been out drinking alcohol and marajuana and fell on her rt side. No consciousness and did not hit her head.  After the fall patient complaining of right-sided chest pain. No shortness of breath. No fevers, chills, nausea, vomiting, diarrhea.     History/Other:    Past Medical History:  Past Medical History[1]  Past Surgical History:   Past Surgical History[2]   Family History:   Family History[3]  Social History:    reports that she has quit smoking. She has never used smokeless tobacco. She reports current alcohol use. She reports current drug use. Drug: Cannabis.     Allergies: Allergies[4]    Medications:  Medications Ordered Prior to Encounter[5]    Review of Systems:   A comprehensive review of systems was completed.    Pertinent positives and negatives noted in the HPI.    Objective:   Physical Exam:    BP 99/62   Pulse 96   Temp 97.8 °F (36.6 °C) (Oral)   Resp 26   LMP 2025 (Approximate)   SpO2 99%   General: No acute distress, Alert  Respiratory: No rhonchi, no wheezes  Cardiovascular: S1, S2. Regular rate and rhythm  Abdomen: Soft, Non-tender, non-distended, positive bowel sounds  Neuro: No new focal deficits  Extremities: No edema      Results:    Labs:      Labs Last 24 Hours:    Recent Labs   Lab 25  2225   RBC 4.60   HGB 12.9   HCT 38.6   MCV 83.9   MCH 28.0   MCHC 33.4   RDW 12.8   NEPRELIM 5.77   WBC 11.0   .0       Recent Labs   Lab 25  2225   *   BUN 10   CREATSERUM 0.74   EGFRCR 116   CA 9.1   ALB 4.8      K 3.7      CO2 22.0   ALKPHO 55   AST 18   ALT 9*   BILT  0.2*   TP 7.4       Estimated Glomerular Filtration Rate: 116 mL/min/1.73m2 (result from lab).    Lab Results   Component Value Date    INR 1.08 08/05/2021       No results for input(s): \"TROP\", \"TROPHS\", \"CK\" in the last 168 hours.    No results for input(s): \"TROP\", \"PBNP\" in the last 168 hours.    No results for input(s): \"PCT\" in the last 168 hours.    Imaging: Imaging data reviewed in Epic.    Assessment & Plan:      # Pneumothorax  - Continue on supplemental O2  - repeat cxr in the am  - Pulmonology on consult  - secondary to trauma from fall/rib fracture    # Rt ninth and tenth rib fracture  - continue on pain medication.    # ETOH intoxication  - continue IVF  - monitor for withdrawal    # Bipolar disorder  - Continue on abilify    Plan of care discussed with patient at bedside.    Roby Sellers, DO    Supplementary Documentation:     The 21st Century Cures Act makes medical notes like these available to patients in the interest of transparency. Please be advised this is a medical document. Medical documents are intended to carry relevant information, facts as evident, and the clinical opinion of the practitioner. The medical note is intended as peer to peer communication and may appear blunt or direct. It is written in medical language and may contain abbreviations or verbiage that are unfamiliar.                                     [1]   Past Medical History:   Anxiety    Bipolar 1 disorder (HCC)    Depression    High cholesterol    Mood disorder    no meds - 2018, no SI or HI    Psychiatric illness    Bi Polar    Thyroid disorder    resolved 2018   [2]   Past Surgical History:  Procedure Laterality Date    Remove tonsils/adenoids,<11 y/o  12/21/09    Performed by PAYAM MORE at [facility], [place]   [3]   Family History  Problem Relation Age of Onset    Hypertension Maternal Grandfather     High Blood Pressure Maternal Grandfather     Heart Disease Maternal Grandfather          enlarged heart    Kidney Disease Maternal Grandfather         cysts on kidneys    Obesity Mother     Depression Mother     Schizophrenia Mother         schizoaffective disorder    Heart Disease Paternal Grandmother         arrhythmia    High Blood Pressure Paternal Grandmother     High Cholesterol Paternal Grandmother     Diabetes Paternal Grandmother     Heart Surgery Maternal Grandfather         and CHF    Other (narcoleepsy) Sister    [4]   Allergies  Allergen Reactions    Omnicef [Cefdinir] SWELLING    Azithromycin OTHER (SEE COMMENTS)     Lost feeling in bilat legs for one week    Veramyst SWELLING   [5]   Current Facility-Administered Medications on File Prior to Encounter   Medication Dose Route Frequency Provider Last Rate Last Admin    [COMPLETED] ondansetron (Zofran) 4 MG/2ML injection 4 mg  4 mg Intravenous Once Edwin Gaffney MD   4 mg at 25 174    [COMPLETED] sodium chloride 0.9 % IV bolus 1,000 mL  1,000 mL Intravenous Once Edwin Gaffney MD   Stopped at 25 184    [COMPLETED] dicyclomine (Bentyl) cap 10 mg  10 mg Oral Once Edwin Gaffney MD   10 mg at 25    [COMPLETED] famotidine (Pepcid) 20 mg/2mL injection 20 mg  20 mg Intravenous Once Edwin Gaffney MD   20 mg at 25    [COMPLETED] sodium chloride 0.9 % IV bolus 2,000 mL  2,000 mL Intravenous Once Edwin Gaffney MD   Stopped at 25    [COMPLETED] metoclopramide (Reglan) 5 mg/mL injection 10 mg  10 mg Intravenous Once Edwin Gaffney MD   10 mg at 25     Current Outpatient Medications on File Prior to Encounter   Medication Sig Dispense Refill    fluvoxaMINE Maleate 25 MG Oral Tab Take 1 tablet (25 mg total) by mouth nightly. 25mg AM, 50mg HS      [] naproxen 250 MG Oral Tab Take 2 tablets (500 mg total) by mouth 2 (two) times daily as needed (pain). 10 tablet 0    [] ondansetron 4 MG Oral Tablet Dispersible Take 1 tablet (4 mg total) by mouth every 8 (eight) hours as needed for  Nausea. 9 tablet 0    [] famotidine 40 MG Oral Tab Take 1 tablet (40 mg total) by mouth at bedtime for 14 days. 14 tablet 0    [] dicyclomine 10 MG Oral Cap Take 1 capsule (10 mg total) by mouth 3 (three) times daily as needed (abdominal cramping). 15 capsule 0    ARIPiprazole 2 MG Oral Tab Take 1 tablet (2 mg total) by mouth daily. 30 tablet 1    omeprazole 20 MG Oral Capsule Delayed Release 20 mg po qAM (Patient not taking: Reported on 2025) 90 capsule 5    Desogestrel-Ethinyl Estradiol (DESOGEN) 0.15-30 MG-MCG Oral Tab Take 1 tablet by mouth daily. 90 tablet 2    ferrous sulfate 325 (65 FE) MG Oral Tab EC Take 1 tablet (325 mg total) by mouth daily with breakfast. (Patient not taking: Reported on 2025) 60 tablet 0    Propranolol HCl 10 MG Oral Tab propranolol 10 mg tablet   TAKE 1 TABLET BY MOUTH THREE TIMES DAILY      Albuterol Sulfate  (90 Base) MCG/ACT Inhalation Aero Soln Inhale 2 puffs into the lungs every 4 (four) hours as needed for Wheezing or Shortness of Breath. (Patient not taking: Reported on 2025) 1 Inhaler 3    Gabapentin 300 MG/6ML Oral Solution Take 400 mg by mouth.        modafinil 200 MG Oral Tab Take 1 tablet (200 mg total) by mouth daily. 30 tablet 0

## 2025-04-24 NOTE — ED QUICK NOTES
Orders for admission, patient is aware of plan and ready to go upstairs. Any questions, please call ED RN Angelina at extension [phone].     Patient Covid vaccination status: Fully vaccinated     COVID Test Ordered in ED: None    COVID Suspicion at Admission: N/A    Running Infusions:  None    Mental Status/LOC at time of transport: alert    Other pertinent information:   CIWA score: N/A   NIH score:  N/A

## 2025-04-24 NOTE — ED PROVIDER NOTES
Patient Seen in: [facility] Emergency Department      History     Chief Complaint   Patient presents with    Fall     Stated Complaint: fall, etoh    Subjective:   HPI    24-year-old female presenting with trauma.  Patient fell striking her ribs on the right side none on the left side earlier today there was no head injury no loss of consciousness.  Patient did have some alcohol and marijuana marijuana.  Patient may have increasing back pain and rib pain since that time prompting visit here she denies any other exacerbating factors or associated symptoms.  History of Present Illness               Objective:     Past Medical History:    Anxiety    Bipolar 1 disorder (HCC)    Depression    High cholesterol    Mood disorder    no meds - 2018, no SI or HI    Psychiatric illness    Bi Polar    Thyroid disorder    resolved 2018              Past Surgical History:   Procedure Laterality Date    Remove tonsils/adenoids,<13 y/o  12/21/09    Performed by PAYAM MORE at [facility] [place]                Social History     Socioeconomic History    Marital status: Single   Tobacco Use    Smoking status: Former    Smokeless tobacco: Never    Tobacco comments:     Currently vaping Juul pen daily    Vaping Use    Vaping status: Some Days   Substance and Sexual Activity    Alcohol use: Yes     Comment: 2-3 a month    Drug use: Yes     Types: Cannabis     Comment: smoke daily.    Sexual activity: Yes     Partners: Male     Birth control/protection: Pill     Social Drivers of Health     Food Insecurity: Unknown (12/4/2024)    Received from [employer]    Food Insecurity     Within the past 12 months, you worried that your food would run out before you got money to buy more.  : Patient declined     Within the past 12 months, the food you bought just didn't last and you didn't have money to get more. : Sometimes true   Transportation Needs: At Risk (12/4/2024)    Received from [employer]     Transportation Needs     In the past 12 months, has lack of reliable transportation kept you from medical appointments, meetings, work or from getting things needed for daily living? : Yes                                Physical Exam     ED Triage Vitals [04/23/25 2224]   BP 99/62   Pulse 97   Resp 18   Temp 97.8 °F (36.6 °C)   Temp src Oral   SpO2 100 %   O2 Device None (Room air)       Current Vitals:   Vital Signs  BP: 99/62  Pulse: 96  Resp: 26  Temp: 97.8 °F (36.6 °C)  Temp src: Oral    Oxygen Therapy  SpO2: 99 %  O2 Device: None (Room air)        Physical Exam  Awake alert but patient is awaiting pain HEENT exam normal lungs normal cardiovascular exam normal abdomen normal extremities normal back exam small abrasion noted to the right rib cage near ribs 678 no subcutaneous emphysema crepitus no vertebral tenderness to palpation  Physical Exam                ED Course     Labs Reviewed   ETHYL ALCOHOL - Abnormal; Notable for the following components:       Result Value    Ethyl Alcohol 244 (*)     All other components within normal limits   COMP METABOLIC PANEL (14) - Abnormal; Notable for the following components:    Glucose 118 (*)     Calculated Osmolality 300 (*)     ALT 9 (*)     Bilirubin, Total 0.2 (*)     All other components within normal limits   CBC WITH DIFFERENTIAL WITH PLATELET - Abnormal; Notable for the following components:    Lymphocyte Absolute 4.43 (*)     All other components within normal limits   RAINBOW DRAW LAVENDER   RAINBOW DRAW LIGHT GREEN   RAINBOW DRAW BLUE          Results            Differential diagnosis includes pneumothorax, lung laceration  A total of 35 minutes of critical care time (exclusive of billable procedures) was administered to manage the patient's critical imaging findings due to her pneumothorax.  This involved direct patient intervention, complex decision making, and/or extensive discussions with the patient, family, and clinical staff.                     ELISABETH           Admission disposition: 4/24/2025 12:17 AM           Medical Decision Making  24-year-old female presenting to the emergency department for trauma.  IV established cardiac monitor shows a sinus rhythm pulse ox shows no signs of hypoxia.  Independent interpretation by ED physician of CT scan shows rib fractures and a small pneumothorax patient was discussed with hospitalist as well as with pulmonary who recommends placing patient on nonrebreather patient was treated for pain control patient will be admitted at this time    Problems Addressed:  Alcoholic intoxication without complication: acute illness or injury  Closed fracture of multiple ribs of right side, initial encounter: acute illness or injury  Traumatic pneumothorax, initial encounter: acute illness or injury    Amount and/or Complexity of Data Reviewed  Labs: ordered. Decision-making details documented in ED Course.  Radiology: ordered and independent interpretation performed. Decision-making details documented in ED Course.  ECG/medicine tests: ordered and independent interpretation performed. Decision-making details documented in ED Course.        Disposition and Plan     Clinical Impression:  1. Closed fracture of multiple ribs of right side, initial encounter    2. Traumatic pneumothorax, initial encounter    3. Alcoholic intoxication without complication         Disposition:  Admit  4/24/2025 12:17 am    Follow-up:  No follow-up provider specified.        Medications Prescribed:  Current Discharge Medication List          Supplementary Documentation:         Hospital Problems       Present on Admission  Date Reviewed: 4/23/2025          ICD-10-CM Noted POA    * (Principal) Closed fracture of multiple ribs of right side, initial encounter S22.41XA 4/23/2025 Unknown

## 2025-04-24 NOTE — PROGRESS NOTES
[facility]   part of [facility]     Hospitalist Progress Note     Shayna Oliver Patient Status:  Observation    10/4/2000 MRN [id number]   Location [facility] 3SW-A Attending Epifanio Fitzpatrick,    Hosp Day # 0 PCP SPARKLE CAMPBELL     Chief Complaint: Fall, right rib pain    Subjective:     Patient states pain is controlled. No SOB. States she wants to leave today and will be leaving AMA if not medically cleared.    Objective:    Review of Systems:   A comprehensive review of systems was completed; pertinent positive and negatives stated in subjective.    Vital signs:  Temp:  [97.8 °F (36.6 °C)-98.5 °F (36.9 °C)] 98.5 °F (36.9 °C)  Pulse:  [70-97] 70  Resp:  [16-26] 16  BP: ()/(36-62) 81/36  SpO2:  [99 %-100 %] 99 %    Physical Exam:    General: No acute distress, awake and alert  Respiratory: No wheezes, no rhonchi  Cardiovascular: S1, S2, regular rate and rhythm  Abdomen: Soft, Non-tender, non-distended, positive bowel sounds  Extremities: No edema    Diagnostic Data:    Labs:  Recent Labs   Lab 25  2225   WBC 11.0   HGB 12.9   MCV 83.9   .0     Recent Labs   Lab 25  2225   *   BUN 10   CREATSERUM 0.74   CA 9.1   ALB 4.8      K 3.7      CO2 22.0   ALKPHO 55   AST 18   ALT 9*   BILT 0.2*   TP 7.4     Estimated Glomerular Filtration Rate: 116 mL/min/1.73m2 (result from lab).    No results for input(s): \"TROP\", \"TROPHS\", \"CK\" in the last 168 hours.    No results for input(s): \"PTP\", \"INR\" in the last 168 hours.     Microbiology  No results found for this visit on 25.    Imaging: Reviewed in Epic.    Medications: Scheduled Medications[1]    Assessment & Plan:      #Right pneumothorax  -Due to trauma and right rib fractures  -Supplemental O2  -Repeat CXR in the morning  -Pulmonology on consult     #Right ninth and tenth rib fracture  -Pain control     #ETOH intoxication  -Monitor for withdrawal - patient states she does not drink every day      #Bipolar disorder  #Anxiety  -Fluvoxamine   -Takes propranolol PRN    #Sleeping disorder  -Modafinil       Patient wants to leave AMA. She understands the risks of leaving AMA including end organ damage and even death. Mother at bedside during conversation. She now is willing to stay until pulmonologist comes by but is set on leaving today. Discussed with patient, mother, RN, Dr. Goodman.    Addendum: Dr. Goodman recommending patient stay. If she leaves, would have to be AMA. Did discus with patient and mother to seek care immediately if she has pain, SOB or does not feel well for any reason. Mother is taking day off work tomorrow. Recommended she follow up with her PCP ASAP.  They both express understanding and were grateful for my recommendations.       Epifanio Fitzpatrick,     Supplementary Documentation:     Quality:  DVT Mechanical Prophylaxis:   SCDs,    DVT Pharmacologic Prophylaxis   Medication    enoxaparin (Lovenox) 40 MG/0.4ML SUBQ injection 40 mg     Code Status: Full Code  Ochoa: No urinary catheter in place  DANIELLE: TBD    Discharge is dependent on: Clinical state, consultant recs  At this point Ms. Oliver is expected to be discharge to: Home    The 21st Century Cures Act makes medical notes like these available to patients in the interest of transparency. Please be advised this is a medical document. Medical documents are intended to carry relevant information, facts as evident, and the clinical opinion of the practitioner. The medical note is intended as peer to peer communication and may appear blunt or direct. It is written in medical language and may contain abbreviations or verbiage that are unfamiliar.               [1]    ARIPiprazole  2 mg Oral Daily    modafinil  200 mg Oral Daily    propranolol  10 mg Oral TID    enoxaparin  40 mg Subcutaneous Daily

## 2025-04-24 NOTE — PROGRESS NOTES
Patient leaving hospital AMA. Hospitalist and Pulm aware and at bedside. IV removed. Belongings sent home with patient, accompanied by mother.

## 2025-04-24 NOTE — PROGRESS NOTES
NURSING ADMISSION NOTE      Patient admitted via Cart  Oriented to room.  Safety precautions initiated.  Bed in low position.  Call light in reach.    Skin check completed with TIESHA Cooper. Small abrasion noted to right mid back.     A&Ox4. Patient having difficulty answering admission questions, appears intoxicated at this time. On 5L O2 NC. Pain reported to right side ribs, PRN pain medication offered. DTV by 8am. Pulm to see.

## 2025-04-26 SDOH — ECONOMIC STABILITY: HOUSING INSECURITY: DO YOU HAVE PROBLEMS WITH ANY OF THE FOLLOWING?: PESTS SUCH AS BUGS, ANTS, OR MICE

## 2025-04-26 SDOH — ECONOMIC STABILITY: FOOD INSECURITY: WITHIN THE PAST 12 MONTHS, THE FOOD YOU BOUGHT JUST DIDN'T LAST AND YOU DIDN'T HAVE MONEY TO GET MORE.: SOMETIMES TRUE

## 2025-04-26 SDOH — ECONOMIC STABILITY: HOUSING INSECURITY: WHAT IS YOUR LIVING SITUATION TODAY?: I HAVE A STEADY PLACE TO LIVE

## 2025-04-26 ASSESSMENT — SOCIAL DETERMINANTS OF HEALTH (SDOH)
IN THE PAST 12 MONTHS, HAS THE ELECTRIC, GAS, OIL, OR WATER COMPANY THREATENED TO SHUT OFF SERVICE IN YOUR HOME?: PATIENT DECLINED

## 2025-04-28 ENCOUNTER — IMAGING SERVICES (OUTPATIENT)
Dept: GENERAL RADIOLOGY | Age: 25
End: 2025-04-28
Attending: INTERNAL MEDICINE

## 2025-04-28 ENCOUNTER — HOSPITAL ENCOUNTER (OUTPATIENT)
Facility: HOSPITAL | Age: 25
Setting detail: OBSERVATION
Discharge: HOME | End: 2025-04-30
Attending: EMERGENCY MEDICINE | Admitting: HOSPITALIST
Payer: MEDICAID

## 2025-04-28 ENCOUNTER — APPOINTMENT (OUTPATIENT)
Dept: CT IMAGING | Facility: HOSPITAL | Age: 25
End: 2025-04-28
Attending: EMERGENCY MEDICINE
Payer: MEDICAID

## 2025-04-28 ENCOUNTER — RESULTS FOLLOW-UP (OUTPATIENT)
Dept: INTERNAL MEDICINE | Age: 25
End: 2025-04-28

## 2025-04-28 ENCOUNTER — APPOINTMENT (OUTPATIENT)
Dept: INTERNAL MEDICINE | Age: 25
End: 2025-04-28

## 2025-04-28 ENCOUNTER — E-ADVICE (OUTPATIENT)
Dept: INTERNAL MEDICINE | Age: 25
End: 2025-04-28

## 2025-04-28 VITALS
HEART RATE: 103 BPM | TEMPERATURE: 97.7 F | SYSTOLIC BLOOD PRESSURE: 98 MMHG | OXYGEN SATURATION: 97 % | BODY MASS INDEX: 18.78 KG/M2 | DIASTOLIC BLOOD PRESSURE: 54 MMHG | WEIGHT: 106 LBS | HEIGHT: 63 IN | RESPIRATION RATE: 16 BRPM

## 2025-04-28 DIAGNOSIS — S27.0XXD TRAUMATIC PNEUMOTHORAX, SUBSEQUENT ENCOUNTER: Primary | ICD-10-CM

## 2025-04-28 DIAGNOSIS — S27.0XXD TRAUMATIC PNEUMOTHORAX, SUBSEQUENT ENCOUNTER: ICD-10-CM

## 2025-04-28 DIAGNOSIS — S22.41XD CLOSED FRACTURE OF MULTIPLE RIBS OF RIGHT SIDE WITH ROUTINE HEALING, SUBSEQUENT ENCOUNTER: ICD-10-CM
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
## 2025-04-28 DIAGNOSIS — S22.41XD CLOSED FRACTURE OF MULTIPLE RIBS OF RIGHT SIDE WITH ROUTINE HEALING, SUBSEQUENT ENCOUNTER: Primary | ICD-10-CM

## 2025-04-28 DIAGNOSIS — S27.0XXA TRAUMATIC PNEUMOTHORAX, INITIAL ENCOUNTER: Primary | ICD-10-CM

## 2025-04-28 LAB — B-HCG UR QL: NEGATIVE

## 2025-04-28 PROCEDURE — 99214 OFFICE O/P EST MOD 30 MIN: CPT | Performed by: INTERNAL MEDICINE

## 2025-04-28 PROCEDURE — 99222 1ST HOSP IP/OBS MODERATE 55: CPT | Performed by: INTERNAL MEDICINE

## 2025-04-28 PROCEDURE — 71046 X-RAY EXAM CHEST 2 VIEWS: CPT | Performed by: RADIOLOGY

## 2025-04-28 PROCEDURE — 99223 1ST HOSP IP/OBS HIGH 75: CPT | Performed by: HOSPITALIST

## 2025-04-28 PROCEDURE — 71250 CT THORAX DX C-: CPT | Performed by: EMERGENCY MEDICINE

## 2025-04-28 RX ORDER — GABAPENTIN 400 MG/1
400 CAPSULE ORAL 3 TIMES DAILY
Status: DISCONTINUED | OUTPATIENT
Start: 2025-04-28 | End: 2025-04-30

## 2025-04-28 RX ORDER — POLYETHYLENE GLYCOL 3350 17 G/17G
17 POWDER, FOR SOLUTION ORAL DAILY PRN
Status: DISCONTINUED | OUTPATIENT
Start: 2025-04-28 | End: 2025-04-30

## 2025-04-28 RX ORDER — REPAGLINIDE 0.5 MG/1
25 TABLET ORAL EVERY MORNING
Status: DISCONTINUED | OUTPATIENT
Start: 2025-04-29 | End: 2025-04-30

## 2025-04-28 RX ORDER — BENZONATATE 200 MG/1
200 CAPSULE ORAL 3 TIMES DAILY PRN
Status: DISCONTINUED | OUTPATIENT
Start: 2025-04-28 | End: 2025-04-30

## 2025-04-28 RX ORDER — SENNOSIDES 8.6 MG
17.2 TABLET ORAL NIGHTLY PRN
Status: DISCONTINUED | OUTPATIENT
Start: 2025-04-28 | End: 2025-04-30

## 2025-04-28 RX ORDER — ECHINACEA PURPUREA EXTRACT 125 MG
1 TABLET ORAL
Status: DISCONTINUED | OUTPATIENT
Start: 2025-04-28 | End: 2025-04-30

## 2025-04-28 RX ORDER — GABAPENTIN 400 MG/1
400 CAPSULE ORAL 3 TIMES DAILY
COMMUNITY

## 2025-04-28 RX ORDER — HEPARIN SODIUM 5000 [USP'U]/ML
5000 INJECTION, SOLUTION INTRAVENOUS; SUBCUTANEOUS EVERY 12 HOURS SCHEDULED
Status: DISCONTINUED | OUTPATIENT
Start: 2025-04-29 | End: 2025-04-30

## 2025-04-28 RX ORDER — DESOGESTREL AND ETHINYL ESTRADIOL 0.15-0.03
1 KIT ORAL DAILY
COMMUNITY

## 2025-04-28 RX ORDER — REPAGLINIDE 0.5 MG/1
50 TABLET ORAL NIGHTLY
COMMUNITY

## 2025-04-28 RX ORDER — REPAGLINIDE 0.5 MG/1
50 TABLET ORAL NIGHTLY
Status: DISCONTINUED | OUTPATIENT
Start: 2025-04-28 | End: 2025-04-30

## 2025-04-28 RX ORDER — ACETAMINOPHEN 500 MG
500 TABLET ORAL EVERY 4 HOURS PRN
Status: DISCONTINUED | OUTPATIENT
Start: 2025-04-28 | End: 2025-04-30

## 2025-04-28 RX ORDER — ONDANSETRON 2 MG/ML
8 INJECTION INTRAMUSCULAR; INTRAVENOUS EVERY 6 HOURS PRN
Status: DISCONTINUED | OUTPATIENT
Start: 2025-04-28 | End: 2025-04-30

## 2025-04-28 RX ORDER — BISACODYL 10 MG
10 SUPPOSITORY, RECTAL RECTAL
Status: DISCONTINUED | OUTPATIENT
Start: 2025-04-28 | End: 2025-04-30

## 2025-04-28 ASSESSMENT — PATIENT HEALTH QUESTIONNAIRE - PHQ9
CLINICAL INTERPRETATION OF PHQ2 SCORE: NO FURTHER SCREENING NEEDED
2. FEELING DOWN, DEPRESSED OR HOPELESS: NOT AT ALL
SUM OF ALL RESPONSES TO PHQ9 QUESTIONS 1 AND 2: 0
1. LITTLE INTEREST OR PLEASURE IN DOING THINGS: NOT AT ALL
SUM OF ALL RESPONSES TO PHQ9 QUESTIONS 1 AND 2: 0

## 2025-04-28 ASSESSMENT — PAIN SCALES - GENERAL: PAINLEVEL_OUTOF10: 4

## 2025-04-28 NOTE — PLAN OF CARE
Problem: Patient/Family Goals  Goal: Patient/Family Long Term Goal  Description: Patient's Long Term Goal: Discharge with adequate resources    Interventions:  - Identify barriers to discharge w/pt and caregiver  - Include patient/family/discharge partner in discharge planning  - Arrange for needed discharge resources and transportation as appropriate  - Identify discharge learning needs   - Consider post-discharge preferences of patient/family/discharge partner  - Assess patient's ability to be responsible for managing their own health  - Refer to Case Management Department for coordinating discharge planning if the patient needs post-hospital services   - See additional Care Plan goals for specific interventions  Outcome: Progressing  Goal: Patient/Family Short Term Goal  Description: Patient's Short Term Goal:   4/28: pulm eval    Interventions:   - See additional Care Plan goals for specific interventions  Outcome: Progressing     Problem: RESPIRATORY - ADULT  Goal: Achieves optimal ventilation and oxygenation  Description: INTERVENTIONS:- Assess for changes in respiratory status- Assess for changes in mentation and behavior- Position to facilitate oxygenation and minimize respiratory effort- Oxygen supplementation based on oxygen saturation or ABGs- Provide Smoking Cessation handout, if applicable- Encourage broncho-pulmonary hygiene including cough, deep breathe, Incentive Spirometry- Assess the need for suctioning and perform as needed- Assess and instruct to report SOB or any respiratory difficulty- Respiratory Therapy support as indicated- Manage/alleviate anxiety- Monitor for signs/symptoms of CO2 retention  Outcome: Progressing

## 2025-04-28 NOTE — PROGRESS NOTES
NURSING ADMISSION NOTE      Patient admitted via Ambulatory  Oriented to room.  Safety precautions initiated.  Bed in low position.  Call light in reach.    A&Ox4. Weaned to room air, O2 sating WNL. Tele-NSR. Reports mild pain in R upper back, has rib fx. Reports 16 pounds weight loss over past few weeks due to stress. Psych and pulm consulted. Pt updated on POC, all questions and concerns addressed, verbalized understanding. Safety precautions in place, no further needs at this time.

## 2025-04-28 NOTE — H&P
[facility]     Shayna Oliver Patient Status:  Emergency    10/4/2000 MRN [id number]   Location [facility] EMERGENCY DEPARTMENT Attending Nilsa Cartwright MD   Hosp Day # 0 PCP SPARKLE CAMPBELL     Chief Complaint: pneumothorax    Subjective:    History of Present Illness:     Shayna Oliver is a 24 year old female with fall on  while trying to make her mom laugh while she was intoxicated on alcohol.  She landed on a metal frame of a boxing heavy bag.  She stayed overnight but then left AMA on .  Did follow up CXR in immediate care and then again today with PCP, which showed increase in pneumothorax today and she says she's having hard time catching her breath.    History/Other:    Past Medical History:  Past Medical History[1]  Past Surgical History:   Past Surgical History[2]   Family History:   Family History[3]    hypertension Social History:    reports that she has quit smoking. She has never used smokeless tobacco. She reports current alcohol use. She reports current drug use. Drug: Cannabis.     Allergies: Allergies[4]    Medications:  Medications Ordered Prior to Encounter[5]    Review of Systems:   A comprehensive 12 point review of systems was completed.    Pertinent positives and negatives noted in the HPI.    Objective:   Physical Exam:    BP 96/61   Pulse 87   Temp 98.1 °F (36.7 °C) (Oral)   Resp 14   Ht 5' 3\" (1.6 m)   Wt 106 lb (48.1 kg)   LMP 2025 (Approximate)   SpO2 100%   BMI 18.78 kg/m²   General: No acute distress, Alert  Respiratory: No rhonchi, no wheezes  Cardiovascular: S1, S2. Regular rate and rhythm  Abdomen: Soft, NT/ND, +BS  Neuro: No new focal deficits  Extremities: No edema      Results:    Labs:      Labs Last 24 Hours:    Recent Labs   Lab 255   RBC 4.60   HGB 12.9   HCT 38.6   MCV 83.9   MCH 28.0   MCHC 33.4   RDW 12.8   NEPRELIM 5.77   WBC 11.0   .0       Recent Labs   Lab 25  2225   GLU  118*   BUN 10   CREATSERUM 0.74   EGFRCR 116   CA 9.1   ALB 4.8      K 3.7      CO2 22.0   ALKPHO 55   AST 18   ALT 9*   BILT 0.2*   TP 7.4       Lab Results   Component Value Date    INR 1.08 08/05/2021       No results for input(s): \"TROP\", \"TROPHS\", \"CK\" in the last 168 hours.    No results for input(s): \"TROP\", \"PBNP\" in the last 168 hours.    No results for input(s): \"PCT\" in the last 168 hours.    Imaging: Imaging data reviewed in Epic.    Assessment & Plan:      #increasing right sided pneumothorax (fell on 4/23)  -Oxygen via nasal cannulae  -pulm consult  -repeat CXR in AM    #right 9th/10th rib fractures from recent mechanical fall    #chronic pain; unclear etiology; has been dealing with this for 10 years  -would consider OP rheum follow up upon discharge.      Will do med rec once review complete.    Quality:  DVT prophylaxis:  SCDs, heparin  Code Status: see chart  Ochoa: NO  Ochoa Duration (in days): N/A  Central line: NO  Estimated discharge date: tbd    Plan of care discussed with patient and ER MD Graham Reddy MD    Supplementary Documentation:                                     [1]   Past Medical History:   Anxiety    Bipolar 1 disorder (HCC)    Depression    High cholesterol    Mood disorder    no meds - 2018, no SI or HI    Pneumothorax    Psychiatric illness    Bi Polar    Thyroid disorder    resolved 2018   [2]   Past Surgical History:  Procedure Laterality Date    Remove tonsils/adenoids,<13 y/o  12/21/09    Performed by PAYAM MORE at [facility], [facility]   [3]   Family History  Problem Relation Age of Onset    Hypertension Maternal Grandfather     High Blood Pressure Maternal Grandfather     Heart Disease Maternal Grandfather         enlarged heart    Kidney Disease Maternal Grandfather         cysts on kidneys    Obesity Mother     Depression Mother     Schizophrenia Mother         schizoaffective disorder    Heart Disease Paternal Grandmother         arrhythmia     High Blood Pressure Paternal Grandmother     High Cholesterol Paternal Grandmother     Diabetes Paternal Grandmother     Heart Surgery Maternal Grandfather         and CHF    Other (narcoleepsy) Sister    [4]   Allergies  Allergen Reactions    Omnicef [Cefdinir] SWELLING    Azithromycin OTHER (SEE COMMENTS)     Lost feeling in bilat legs for one week    Veramyst SWELLING   [5]   Current Facility-Administered Medications on File Prior to Encounter   Medication Dose Route Frequency Provider Last Rate Last Admin    [COMPLETED] ketorolac (Toradol) 15 MG/ML injection 15 mg  15 mg Intravenous Once Jesse Taylor MD   15 mg at 04/23/25 2237    [COMPLETED] ondansetron (Zofran) 4 MG/2ML injection 4 mg  4 mg Intravenous Once Edwin Gaffney MD   4 mg at 02/26/25 1749    [COMPLETED] sodium chloride 0.9 % IV bolus 1,000 mL  1,000 mL Intravenous Once Edwin Gaffney MD   Stopped at 02/26/25 1849    [COMPLETED] dicyclomine (Bentyl) cap 10 mg  10 mg Oral Once Edwin Gaffney MD   10 mg at 02/26/25 1934    [COMPLETED] famotidine (Pepcid) 20 mg/2mL injection 20 mg  20 mg Intravenous Once Edwin Gaffney MD   20 mg at 02/26/25 1932    [COMPLETED] sodium chloride 0.9 % IV bolus 2,000 mL  2,000 mL Intravenous Once Edwin Gaffney MD   Stopped at 02/26/25 2124    [COMPLETED] metoclopramide (Reglan) 5 mg/mL injection 10 mg  10 mg Intravenous Once Edwin Gaffney MD   10 mg at 02/26/25 1931     Current Outpatient Medications on File Prior to Encounter   Medication Sig Dispense Refill    fluvoxaMINE Maleate 25 MG Oral Tab Take 1 tablet (25 mg total) by mouth nightly. 25mg AM, 50mg HS      Desogestrel-Ethinyl Estradiol (DESOGEN) 0.15-30 MG-MCG Oral Tab Take 1 tablet by mouth daily. 90 tablet 2    Propranolol HCl 10 MG Oral Tab Take 1 tablet (10 mg total) by mouth daily as needed (anxiety).      modafinil 200 MG Oral Tab Take 1 tablet (200 mg total) by mouth daily. 30 tablet 0

## 2025-04-28 NOTE — ED PROVIDER NOTES
Patient Seen in: [facility] Emergency Department      History     Chief Complaint   Patient presents with    Difficulty Breathing     Stated Complaint: 2broken onright side, and small pneumothorax on right side, pt was at pcp offic*    Subjective:   HPI    24-year-old patient presents to the emergency department.  5 days ago she had a fall onto her back onto a metal pole she was seen in the emergency department she had broken a few ribs and she was told she had a partially collapsed lung she you did 2 went home AGAINST MEDICAL ADVICE at that time but did follow-up the next day at the urgent care and then returned again yesterday was told yesterday that the pneumothorax was slightly bigger but actually got the urgent care on the  they can barely see the pneumothorax at all so she return to the ER she states that from a pain standpoint she feels \"okay\" and she thinks she has been slightly short of breath with exertion.  She states that she i wants to make sure that that pneumothorax is not actually significantly worsening.  History of Present Illness               Objective:     Past Medical History:    Anxiety    Asthma (HCC)    Bipolar 1 disorder (HCC)    Depression    High cholesterol    Mood disorder    no meds - 2018, no SI or HI    Pneumothorax    Psychiatric illness    Bi Polar    Thyroid disorder    resolved               Past Surgical History:   Procedure Laterality Date    Remove tonsils/adenoids,<11 y/o  09    Performed by PAYAM MORE at [facility], [facility]                Social History     Socioeconomic History    Marital status: Single   Tobacco Use    Smoking status: Former     Types: Cigarettes     Start date: 10/28/2020     Quit date: 2019     Years since quittin.0    Smokeless tobacco: Never    Tobacco comments:     Currently vaping Juul pen daily    Vaping Use    Vaping status: Some Days    Substances: Nicotine, Flavoring    Devices: Pre-filled or refillable  cartridge, Pre-filled pod   Substance and Sexual Activity    Alcohol use: Yes     Comment: 2-3 a month    Drug use: Yes     Types: Cannabis     Comment: smoke daily.    Sexual activity: Yes     Partners: Male     Birth control/protection: Pill     Social Drivers of Health     Food Insecurity: Food Insecurity Present (4/28/2025)    NCSS - Food Insecurity     Worried About Running Out of Food in the Last Year: Yes     Ran Out of Food in the Last Year: Yes   Transportation Needs: Unmet Transportation Needs (4/28/2025)    NCSS - Transportation     Lack of Transportation: Yes   Housing Stability: Not At Risk (4/28/2025)    NCSS - Housing/Utilities     Has Housing: Yes     Worried About Losing Housing: No     Unable to Get Utilities: No                                Physical Exam     ED Triage Vitals [04/28/25 1228]   /72   Pulse 104   Resp 18   Temp 98.1 °F (36.7 °C)   Temp src Oral   SpO2 99 %   O2 Device None (Room air)       Current Vitals:   Vital Signs  BP: 95/55  Pulse: 64  Resp: 18  Temp: 98.3 °F (36.8 °C)  Temp src: Oral  MAP (mmHg): (!) 64    Oxygen Therapy  SpO2: 100 %  O2 Device: None (Room air)  O2 Flow Rate (L/min): 2 L/min  Pulse Oximetry Type: Continuous  Oximetry Probe Site Changed: No  Pulse Ox Probe Location: Left hand        Physical Exam  Pleasant well-developed well-nourished 24-year-old lying on emergency department bed she is in no acute distress she speaking in full and complete sentences her HEENT exam reveals pupils are equal round reactive to light her extraocular movements are intact her neck is supple her lungs are clear to auscultation she does have chest wall tenderness to palpation along the right chest and along the entire posterior right thoracic back without significant crepitus noted on the chest wall.  Patient is moving all extremities.  Physical Exam                ED Course     Labs Reviewed   POCT PREGNANCY URINE - Normal   BASIC METABOLIC PANEL (8)   CBC, PLATELET; NO  DIFFERENTIAL   RAINBOW DRAW LAVENDER   RAINBOW DRAW LIGHT GREEN          Results            Will get a CT of the chest to make a clear comparison     CT of the chest does show still a small pneumothorax but it is worsening it is gone from 0.6 cm to 2.2 cm so is getting larger.  Patient is tachycardic at times but oxygen saturations are 100% the lungs themselves appear to be nondisease normal lung so injury that should be getting better but it simply is not.  Discussed the case with pulmonologist and the hospitalist.                MDM      24-year-old presents to the emergency department with concern her pneumothorax may be worsening differential diagnosis includes #1 differences in radiology techniques #2 worsening pneumothorax patient does have known rib fractures from this initial injury.    Admission disposition: 4/28/2025  3:21 PM       Discussed with the patient is that the best way to make the decision whether or not the pneumothorax is worsening is that we need to repeat the plain CT of the chest that was initially done on 4/23/2025.  In theory a small pneumothorax should be improving but that will give us the best comparison exam to see if this patient would need a chest tube.  Patient was amenable to the plan we will get a pregnancy test and a CT of the chest at this time I did offer IV and pain medication but patient states that she does not feel like she needs any of that she just needs to see what the CT of the chest shows to make an informed decision at this time.    Medical Decision Making      Disposition and Plan     Clinical Impression:  1. Traumatic pneumothorax, initial encounter    2. Closed fracture of multiple ribs of right side with routine healing, subsequent encounter         Disposition:  Admit  4/28/2025  3:21 pm    Follow-up:  No follow-up provider specified.        Medications Prescribed:  Current Discharge Medication List          Supplementary Documentation:         Hospital Problems        Present on Admission  Date Reviewed: 4/23/2025          ICD-10-CM Noted POA    * (Principal) Traumatic pneumothorax, initial encounter S27.0XXA 4/24/2025 Unknown    Closed fracture of multiple ribs of right side with routine healing, subsequent encounter S22.41XD 4/28/2025 Unknown

## 2025-04-28 NOTE — ED INITIAL ASSESSMENT (HPI)
Pt presents to the ED with c/o enlarged pneumothorax per CXR.  Pt reports she had a fall with admission on 4/23--2 rib fx's and pneumo, no chest tube. Pt left AMA. Pt had follow up today that showed slight increase in size. Pt c/o feeling short of breath. Pt awake and alert, skin w/d,resps appear unlabored but + cough noted.

## 2025-04-28 NOTE — ED QUICK NOTES
Orders for admission, patient is aware of plan and ready to go upstairs. Any questions, please call ED RN Reema  at extension [phone].     Patient Covid vaccination status: Fully vaccinated     COVID Test Ordered in ED: None    COVID Suspicion at Admission: N/A    Running Infusions: Medication Infusions[1] None    Mental Status/LOC at time of transport: Alert and Oriented 4/4    Other pertinent information:   CIWA score: N/A   NIH score:  N/A             [1]

## 2025-04-29 ENCOUNTER — APPOINTMENT (OUTPATIENT)
Dept: GENERAL RADIOLOGY | Facility: HOSPITAL | Age: 25
End: 2025-04-29
Attending: INTERNAL MEDICINE
Payer: MEDICAID

## 2025-04-29 LAB
ANION GAP SERPL CALC-SCNC: 10 MMOL/L (ref 0–18)
BUN BLD-MCNC: 15 MG/DL (ref 9–23)
CALCIUM BLD-MCNC: 9.8 MG/DL (ref 8.7–10.6)
CHLORIDE SERPL-SCNC: 107 MMOL/L (ref 98–112)
CO2 SERPL-SCNC: 23 MMOL/L (ref 21–32)
CREAT BLD-MCNC: 0.77 MG/DL (ref 0.55–1.02)
EGFRCR SERPLBLD CKD-EPI 2021: 110 ML/MIN/1.73M2 (ref 60–?)
ERYTHROCYTE [DISTWIDTH] IN BLOOD BY AUTOMATED COUNT: 12.5 %
GLUCOSE BLD-MCNC: 83 MG/DL (ref 70–99)
HCT VFR BLD AUTO: 36.3 % (ref 35–48)
HGB BLD-MCNC: 12.1 G/DL (ref 12–16)
MCH RBC QN AUTO: 27.9 PG (ref 26–34)
MCHC RBC AUTO-ENTMCNC: 33.3 G/DL (ref 31–37)
MCV RBC AUTO: 83.8 FL (ref 80–100)
OSMOLALITY SERPL CALC.SUM OF ELEC: 290 MOSM/KG (ref 275–295)
PLATELET # BLD AUTO: 262 10(3)UL (ref 150–450)
POTASSIUM SERPL-SCNC: 3.9 MMOL/L (ref 3.5–5.1)
RBC # BLD AUTO: 4.33 X10(6)UL (ref 3.8–5.3)
SODIUM SERPL-SCNC: 140 MMOL/L (ref 136–145)
WBC # BLD AUTO: 5 X10(3) UL (ref 4–11)

## 2025-04-29 PROCEDURE — 99231 SBSQ HOSP IP/OBS SF/LOW 25: CPT | Performed by: INTERNAL MEDICINE

## 2025-04-29 PROCEDURE — 71045 X-RAY EXAM CHEST 1 VIEW: CPT | Performed by: INTERNAL MEDICINE

## 2025-04-29 PROCEDURE — 99232 SBSQ HOSP IP/OBS MODERATE 35: CPT | Performed by: HOSPITALIST

## 2025-04-29 RX ORDER — MODAFINIL 100 MG/1
200 TABLET ORAL DAILY
Status: DISCONTINUED | OUTPATIENT
Start: 2025-04-29 | End: 2025-04-30

## 2025-04-29 RX ORDER — DOXEPIN HYDROCHLORIDE 50 MG/1
1 CAPSULE ORAL DAILY
Status: DISCONTINUED | OUTPATIENT
Start: 2025-04-30 | End: 2025-04-30

## 2025-04-29 NOTE — PROGRESS NOTES
[facility] Pulmonary Progress Note    Shayna Oliver Patient Status:  Observation    10/4/2000 MRN [id number]   Location [facility] 5NW-A Attending Valentin Ly MD   Hosp Day # 0 PCP SPARKLE CAMPBELL     Subjective:    Overnight: No acute events overnight. Afebrile. On 6L NC for pneumothorax. States her breathing is better, not SOB but has rib discomfort,   Overall thinks her breathing is tremendously better denies specific chest pain  Up walking the halls without limitation    Objective:  /62 (BP Location: Left arm)   Pulse 86   Temp 98 °F (36.7 °C) (Oral)   Resp 17   Ht 5' 3\" (1.6 m)   Wt 105 lb (47.6 kg)   LMP 2025 (Approximate)   SpO2 100%   BMI 18.60 kg/m²     Temp (24hrs), Av.1 °F (36.7 °C), Min:98 °F (36.7 °C), Max:98.3 °F (36.8 °C)      Intake/Output:  No intake or output data in the 24 hours ending 25 1007    Physical Exam:   General: alert, cooperative, oriented.  No respiratory distress.   Head: Normocephalic, without obvious abnormality, atraumatic.   Throat: Lips, mucosa, and tongue normal.  No thrush noted.  No subcu air   Neck: trachea midline, no adenopathy, no thyromegaly. No JVD.   Lungs: clear to auscultation on left, diminished breath sounds R apex, R anterior, R base   Chest wall: No tenderness or deformity.   Heart: regular rate and rhythm, S1, S2 normal, no murmur, click, rub or gallop mildly tachypneic   Abdomen: soft, non-distended, no masses, no guarding, no     Rebound.   Extremity: No edema or cyanosis   Skin: No rashes or lesions.   Neurological: Alert, interactive, no focal deficits    Lab Data Review:  Recent Labs     25  0653   WBC 5.0   HGB 12.1   .0     Recent Labs     25  0727      K 3.9      CO2 23.0   BUN 15   CREATSERUM 0.77     No results for input(s): \"PTP\", \"INR\", \"PTT\" in the last 168 hours.    Cultures:   No results found for this visit on 25.    Radiology:  XR CHEST AP PORTABLE   (BIN=[id number])  Narrative: PROCEDURE:  XR CHEST AP PORTABLE  (CPT=71045)     TECHNIQUE:  AP chest radiograph was obtained.     COMPARISON:  [facility] , CT, CT CHEST (CPT=71250), 4/28/2025, 1:57 PM.  [facility] , XR, XR CHEST PA + LAT CHEST (CPT=71020), 4/25/2017, 11:15 PM.  [facility] , XR, XR CHEST PA + LAT CHEST (CPT=71020), 6/18/2016, 12:33 PM.     INDICATIONS:  Dyspnea     PATIENT STATED HISTORY: (As transcribed by Technologist)  Patient offered no additional history at this time.          FINDINGS:  Cardiac size and pulmonary vasculature are within normal limits. No pleural effusions.  There is a right pneumothorax measuring 1.7 cm at the apex.                       Impression: CONCLUSION:  Right sided pneumothorax measuring 1.7 cm at the apex.         LOCATION:  [id number]                 Dictated by (CST): Mimi Bloom MD on 4/29/2025 at 6:02 AM       Finalized by (CST): Mimi Bloom MD on 4/29/2025 at 6:04 AM         Medications reviewed     Assessment:  Traumatic rib fractures  Traumatic pneumothorax-slight increase from 4/23 by CT-denies subsequent trauma plan to follow on oxygen therapy carefully for need for chest tube  History of idiopathic hypersomnia-recommend she follow-up with our sleep physician as there are new medications available       Plan:  Continue oxygen therapy fro reabsorption of pneumothorax   Serial chest x-rays  Following for need for chest tube placement    Discussed with patient, Eugenie Starkey and Dr Das    Is this a shared or split note between Advanced Practice Provider and Physician? Yes   ZURI Wetzel  [facility] Pulmonary Medicine  Office: [phone]    Patient seen and examined and agree with the above  Continue O2 therapy  Plan for repeat chest x-ray in a.m. at home if improved or stable  Plans on following up with our sleep physicians    Brittany Das MD

## 2025-04-29 NOTE — PROGRESS NOTES
[facility]   [facility] of [facility]     Hospitalist Progress Note     Shayna Oliver Patient Status:  Observation    10/4/2000 MRN [id number]   Location [facility] 5NW-A Attending Valentin Ly MD   Hosp Day # 0 PCP SPARKLE CAMPBELL     Subjective:   Feeling better on 7L O2     Objective:    Review of Systems:   A comprehensive review of systems was completed; pertinent positive and negatives stated in subjective.  Vital signs:  Temp:  [98 °F (36.7 °C)-98.3 °F (36.8 °C)] 98.2 °F (36.8 °C)  Pulse:  [] 70  Resp:  [14-18] 16  BP: ()/(47-87) 90/47  SpO2:  [99 %-100 %] 100 %  Physical Exam:    General: No acute distress    Respiratory: no wheezes, no rhonchi  Cardiovascular: S1, S2, RRR  Abdomen: Soft, NT/ND, +BS  Extremities: no edema    Diagnostic Data:    Labs:  Recent Labs   Lab 25  2225   WBC 11.0   HGB 12.9   MCV 83.9   .0     Recent Labs   Lab 25  2225   *   BUN 10   CREATSERUM 0.74   CA 9.1   ALB 4.8      K 3.7      CO2 22.0   ALKPHO 55   AST 18   ALT 9*   BILT 0.2*   TP 7.4     Estimated Creatinine Clearance: 88.1 mL/min (based on SCr of 0.74 mg/dL).  No results for input(s): \"PTP\", \"INR\" in the last 168 hours.     Microbiology  No results found for this visit on 25.  Imaging: Reviewed in Epic.  Medications: Scheduled Medications[1]    Assessment & Plan:    #increasing right sided pneumothorax (fell on )  -Oxygen via nasal cannulae 7L  -pulm consult  -repeat CXR this AM 1.7cm      #right 9th/10th rib fractures from recent mechanical fall     #chronic pain; unclear etiology; has been dealing with this for 10 years  -would consider OP rheum follow up upon discharge.           Supplementary Documentation:   Quality:  DVT Mechanical Prophylaxis:   SCDs,    DVT Pharmacologic Prophylaxis   Medication    heparin (Porcine) 5000 UNIT/ML injection 5,000 Units                Code Status: Full Code  Ochoa: No urinary catheter in place  Ochoa  Duration (in days):   Central line:    DANIELLE:   At this point Ms. Oliver is expected to be discharge to: home     The 21st Century Cures Act makes medical notes like these available to patients in the interest of transparency. Please be advised this is a medical document. Medical documents are intended to carry relevant information, facts as evident, and the clinical opinion of the practitioner. The medical note is intended as peer to peer communication and may appear blunt or direct. It is written in medical language and may contain abbreviations or verbiage that are unfamiliar.                [1]    heparin  5,000 Units Subcutaneous 2 times per day    fluvoxaMINE  50 mg Oral Nightly    fluvoxaMINE Maleate  25 mg Oral QAM    gabapentin  400 mg Oral TID

## 2025-04-29 NOTE — DISCHARGE INSTRUCTIONS
Transport Resources:  *Contact your medicaid to inquire about food and/or transport assistance  -[employer] [place] - [phone] [email or web address]  -[employer] [place] [phone] or [phone] [email or web address]  -[employer] [phone] [email or web address]      [facility] 24/7 Crisis Line: (118) 850-9974 and (577) 399-8380    [facility]'s National Helpline: 8-251-641-HELP (7857)  The National [place] on Mental Illness, (LUIS) Call 1-435.783.7369, or chat, or text \"Friend\" to 65021, or email helpline@luis.org to connect with us.  [place] Warm Line available Mon-Sat 8am-8pm (804) 008-6627  [facility] Crisis Services 24/7: [phone] 355-9085  Additional Hotline/Support Numbers  National Suicide Prevention: 1.180.200.9785  National Suicide Hotline- 988lifeline.org- 988  National Hopeline: 9.666.888.9825  Samariteens: 1.563.455.1722  800 DON'T CUT: 1.907.573.8362  Crisis Text Line: Text “START” to 137-020/Text HOME to 019811  The Johnny Project (LGBTQ youth): 5.193.119.6864  TRANS LIFELINE: 1.811.552.1576  [facility] Referral Helpline: 610.866.9411  For Crisis Support in Ukrainian: 1.124.148.6303  CARES line for Patients with Medicaid (24/7): 1.100.611.6164  [place] Knei0Qecn Text Line: Text TALK to 144330 for English and HABLAR for Ukrainian (24/7)   [place] Coalition Against Sexual Assault: 2.465.771.0739  National Sexual Assault Hotline: 9.449.897.4023  [place] Coalition Against Domestic Violence: 6.063.898.5176  National Domestic Violence Hotline: 1.533.690.5607  Rape, Abuse, and Incest National Network: 9.692.832.2444  [place] Child Abuse Hotline: 1.501.733.1254  [place] Adult Protective Services: 7.583.369.0637  National Human Trafficking Hotline: 8.355.741.3164 ; Text 685568  [facility] Department of Human Services 211: 9.476.190.7076  Outpatient Therapy Medicaid    [facility]  [facility]  [address] [place] [address]       [phone]    [facility]  [address] [place] [address]  [phone]    [facility]              [address] [place] [address]   [phone]  [facility]  [address] [facility] [place] [address]   [phone]    [facility]  [address] [place] [address]   [phone]    [facility]  [address] [place], [place] [address] [phone]    [facility]  [address] [place], [place] [address] [phone]    [facility]  [address] [place], [place] [address] [phone]    [facility]  [address] [place] [id number] [phone]    [address] [address] [phone]    [facility]  [address] [place], [place] [address] [phone]    [facility]  [address] [place] [address]   [phone]    [address] [place], [place] [address] [phone]    NPS  Telehealth Only.   [phone]    [facility]  [address] [place], [place] [address] [phone]    [facility]  [address], [address]  [place] [address]  [phone]    [place], [place] [address] [phone]    [facility] [employer]  Telehealth and in-person visits  [phone]  Email to schedule your first appointment: [email or web address]    Outpatient Medication Management Medicaid  [facility]  [address] [place] [address]   [phone]  [facility]  [address] [place] [address]  [phone]    [facility]      [address]  [place]. [address]  [phone]    [facility]  [address] [place], [place] [address] [phone]    [facility] [address] [place], [place]. [address] [phone]    [address] [address]   [phone]    [facility] [address] [place], [place] [address] [phone]    [facility]  Telehealth Only   [phone]    [facility]  Telehealth Only   [phone]

## 2025-04-29 NOTE — DIETARY MALNUTRITION NOTE
[facility]   part of [facility]  NUTRITION ASSESSMENT    Pt meets severe malnutrition criteria at this time.    CRITERIA FOR MALNUTRITION DIAGNOSIS:  Criteria for severe malnutrition diagnosis: acute illness/injury related to wt loss greater than 7.5% in 3 months and energy intake less than 50% for greater than 5 days    NUTRITION INTERVENTION:    RD nutrition Care Plan- Initiated ONS (oral nutritional supplements) and Ordered multivitamin  Meal and Snacks - Continue Regular Diet as tolerated; monitor patient po intake. Encourage adequate po of appropriate diet.  Medical Food Supplements - RD added Ensure Plus HP chocolate TID. Rationale/use for oral supplements discussed.  Vitamin and Mineral Supplements - Recommend adding Multivitamin with minerals    PATIENT STATUS: 24 year old female admitted on 4/28 presents with pneumothorax. Pt screened d/t MST score 2. Visited pt at bedside. Pt reports decreased appetite at baseline and sometimes only eats dinner. She does report remote hx of eating disorder but reports this is no longer an active issue. States she lost over 20 lbs in 2021 d/t issue with ongoing nausea but has been able to gain most of the wt back in the past year. However she does endorse 5-10 lb wt loss in the past 1-2 months. Does report increased stress and poor appetite since her fall 1 week ago - only drinking liquids. Reports having liquid BM today. No chewing or swallowing difficulties and NKFA. Reports she used to drink Ensure at home when she was trying to gain wt and is agreeable to chocolate Ensure during admit (but also wants to try one vanilla). Pt also requesting outpatient RD contact info as she is interested in follow up after discharge. All questions answered at this time.    PMH:  has a past medical history of Anxiety, Asthma (HCC), Bipolar 1 disorder (HCC), Depression, High cholesterol, Mood disorder, Pneumothorax, Psychiatric illness, and Thyroid  disorder.    ANTHROPOMETRICS:  Ht: 160 cm (5' 3\")  Wt: 47.6 kg (105 lb).   BMI: Body mass index is 18.6 kg/m².  IBW: 52.3 kg    WEIGHT HISTORY: Per chart, pt with 10 lb wt loss x 2 months (9%, significant per standards).  Patient Weight(s) for the past 336 hrs:   Weight   04/28/25 1647 47.6 kg (105 lb)   04/28/25 1228 48.1 kg (106 lb)       Wt Readings from Last 10 Encounters:   04/28/25 47.6 kg (105 lb)   02/26/25 52.2 kg (115 lb)   01/12/22 42 kg (92 lb 9.6 oz)   12/16/21 40.8 kg (90 lb)   11/16/21 44 kg (97 lb)   10/22/21 46.3 kg (102 lb)   08/18/21 49 kg (108 lb)   08/18/21 48.5 kg (107 lb)   04/08/21 56.7 kg (125 lb)   02/08/21 55.3 kg (122 lb)        NUTRITION:  Diet:       Procedures    Regular/General diet Is Patient on Accuchecks? No      Food Allergies: No  Cultural/Ethnic/Denominational Preferences Addressed: Yes    Percent Meals Eaten (last 3 days)       None            GI SYSTEM REVIEW: WNL; last BM 4/28  Skin/Wounds: WNL    NUTRITION RELATED PHYSICAL FINDINGS:     1. Body Fat/Muscle Mass: no wasting noted      2. Fluid Accumulation: none per RN documentation    NUTRITION PRESCRIPTION:  47.6 kg Actual Body Weight  Calories: 2894-1826 calories/day (30-35 kcal/kg)  Protein: 57-71 grams protein/day (1.2-1.5 gm/kg)  Fluid: ~1 ml/kcal or per MD discretion    NUTRITION DIAGNOSIS/PROBLEM:  Malnutrition related to insufficient appetite resulting in inadequate nutrition intake as evidenced by documented/reported insufficient oral intake and documented/reported unintentional weight loss    MONITOR AND EVALUATE/NUTRITION GOALS:  PO intake of 75% of meals TID - New  PO intake of 75% of oral nutrition supplement/s - New  Weight stable within 1 to 2 lbs during admission - New      MEDICATIONS:  Reviewed     LABS:  Reviewed     Pt is at High nutrition risk    Nilsa Keating, RD, LDN, CNSC  Clinical Dietitian  Spectra: [phone]

## 2025-04-29 NOTE — BH PROGRESS NOTE
This psych liaison/crisis  attempted to meet with pt. Pt not in room. Will follow-up later on phq-4 result.

## 2025-04-29 NOTE — PLAN OF CARE
Pt is a&ox4. Glasses. 7L per pulm. RA is baseline. NSR, ST on tele. Refusing heparin. Voids per BRP, up ad brandon. Tolerating regular diet. Pt updated on poc. No further needs at this time.    Problem: Patient/Family Goals  Goal: Patient/Family Long Term Goal  Description: Patient's Long Term Goal: Discharge with adequate resources    Interventions:  - Identify barriers to discharge w/pt and caregiver  - Include patient/family/discharge partner in discharge planning  - Arrange for needed discharge resources and transportation as appropriate  - Identify discharge learning needs   - Consider post-discharge preferences of patient/family/discharge partner  - Assess patient's ability to be responsible for managing their own health  - Refer to Case Management Department for coordinating discharge planning if the patient needs post-hospital services   - See additional Care Plan goals for specific interventions  Outcome: Progressing  Goal: Patient/Family Short Term Goal  Description: Patient's Short Term Goal:   4/28: pulm eval    Interventions:   - See additional Care Plan goals for specific interventions  Outcome: Progressing

## 2025-04-29 NOTE — PROGRESS NOTES
Received pt A&Ox4.  on 6L. NSR on tele. heparin for VTE prophylaxis. Meds per MAR. Tolerating diet. Voids via BRP. Up self. Denies Pain. Plan of care continues, no further needs at this time.

## 2025-04-29 NOTE — CONSULTS
[facility]  Report of Consultation    Shayna Oliver Patient Status:  Observation    10/4/2000 MRN [id number]   Location [facility] 5NW-A Attending Julien Faulkner MD   Hosp Day # 0 PCP SPARKLE CAMPBELL     Reason for Consultation:  Pneumothorax    History of Present Illness:  Shayna Oliver is a a(n) 24 year old female.  Denies cigarette smoking/reports some vaping and cannabis smoking-with a history of idiopathic hypersomnia bipolar disorder and anxiety-while intoxicated had fallen  striking her ribs sustaining rib fractures 9th and 10th and  small right apical pneumothorax.  Patient signed out AMA related to anxiety..  She has had ongoing pain and some sense of dyspnea though unclear if worsening or stable.  She presented to her primary care physician for evaluation posthospital stay today.  She underwent a CT scan demonstrating slight increase in the pneumothorax size and patient has been admitted.  Currently she notes mild pain or feeling of coldness across her right posterior particularly when taking a breath.  She had noted some sense of shortness of breath but more at rest.  She has had no fevers chills or sweats she denies any subsequent fall or trauma.    History:  Past Medical History[1]   Anxiety    Asthma (HCC)    Bipolar 1 disorder (HCC)    Depression    High cholesterol    Mood disorder     no meds - 2018, no SI or HI    Pneumothorax    Psychiatric illness     Bi Polar    Thyroid disorder     resolved        Family History[2]   reports that she quit smoking about 6 years ago. Her smoking use included cigarettes. She started smoking about 4 years ago. She has never used smokeless tobacco. She reports current alcohol use. She reports current drug use. Drug: Cannabis.    Allergies:  Allergies[3]    Medications:  Prescriptions Prior to Admission[4]    Review of Systems:    Constitutional: States she continues to have difficulty maintaining her weight has lost some weight  since the trauma  Eyes: Denies any vision changes  Ears, nose, mouth, throat, and face: Denies any sore throat difficulty swallowing or aspiration tendency  Respiratory: As above  Cardiovascular: Unaware of any palpitations or specific chest pain  Gastrointestinal: Denies any reflux or heartburn has had no nausea vomiting or diarrhea  Musculoskeletal: Denies any lower extremity edema or pain  Neurological: Reports ongoing difficulties with idiopathic hypersomnia remains on modafinil     All other review of systems are negative.  Reports negative sleep study in the past for [facility]    Vital signs in last 24 hours:  Patient Vitals for the past 24 hrs:   BP Temp Temp src Pulse Resp SpO2 Height Weight   04/28/25 1647 94/61 -- -- 65 -- 100 % -- 105 lb (47.6 kg)   04/28/25 1635 (!) 88/87 98 °F (36.7 °C) Oral 76 18 99 % -- --   04/28/25 1530 101/70 -- -- 83 14 100 % -- --   04/28/25 1447 96/61 -- -- 87 14 100 % -- --   04/28/25 1326 98/66 -- -- 92 16 100 % -- --   04/28/25 1228 109/72 98.1 °F (36.7 °C) Oral 104 18 99 % 5' 3\" (1.6 m) 106 lb (48.1 kg)         Physical Exam:   General: alert, cooperative, oriented.  No respiratory distress.   Head: Normocephalic, without obvious abnormality, atraumatic.   Eyes/Nose: Pupils equal and reactive   Throat: Lips, mucosa, and tongue normal.  No thrush noted.   Neck: trachea midline, no adenopathy, no thyromegaly. No JVD.  No subcu air   Lungs: Minimally diminished tones right greater than left no auscultated wheeze   Chest wall: No tenderness or deformity.   Heart: Regular rate rhythm no murmurs noted   Abdomen: soft, non-distended, no masses, no guarding, no     Rebound.  No obvious hepatosplenomegaly or ascites no edema no clubbing   Extremity:    Skin: No rashes or lesions.   Neurological: Alert, interactive, no focal deficits    Lab Data Review:       Cultures:   Reviewed    Radiology:  CT CHEST (CPT=71250)  Result Date: 4/28/2025  CONCLUSION:   1.  Mild increase in size of  right-sided pneumothorax without evidence of midline shift.   2.  Stable nondisplaced right posterior rib fractures.  LOCATION:  [facility]   Dictated by (CST): Cliff Ruff MD on 4/28/2025 at 2:30 PM     Finalized by (CST): Cliff Ruff MD on 4/28/2025 at 2:37 PM       CT reviewed rib fractures are noted slight increase in apical and basilar pneumothorax no evidence of focal pulmonary infiltrates    Assessment and Plan:  Problem List[5]    Assessment:  Traumatic rib fractures  Traumatic pneumothorax-slight increase from 4/23-denies subsequent trauma plan to follow on oxygen therapy carefully for need for chest tube  History of idiopathic hypersomnia-recommend she follow-up with our sleep physician as there are new medications available    Plan:  Plan for oxygen therapy  Serial chest x-rays  Following for need for chest tube placement    Brittany Das MD  4/28/2025  7:47 PM       [1]   Past Medical History:   Anxiety    Asthma (HCC)    Bipolar 1 disorder (HCC)    Depression    High cholesterol    Mood disorder    no meds - 2018, no SI or HI    Pneumothorax    Psychiatric illness    Bi Polar    Thyroid disorder    resolved 2018   [2]   Family History  Problem Relation Age of Onset    Hypertension Maternal Grandfather     High Blood Pressure Maternal Grandfather     Heart Disease Maternal Grandfather         enlarged heart    Kidney Disease Maternal Grandfather         cysts on kidneys    Obesity Mother     Depression Mother     Schizophrenia Mother         schizoaffective disorder    Heart Disease Paternal Grandmother         arrhythmia    High Blood Pressure Paternal Grandmother     High Cholesterol Paternal Grandmother     Diabetes Paternal Grandmother     Heart Surgery Maternal Grandfather         and CHF    Other (narcoleepsy) Sister    [3]   Allergies  Allergen Reactions    Omnicef [Cefdinir] SWELLING    Azithromycin OTHER (SEE COMMENTS)     Lost feeling in bilat legs for one week    Veramyst SWELLING   [4]    Medications Prior to Admission   Medication Sig Dispense Refill Last Dose/Taking    Desogestrel-Ethinyl Estradiol (APRI) 0.15-30 MG-MCG Oral Tab Take 1 tablet by mouth daily.   4/28/2025 at  8:00 AM    fluvoxaMINE 50 MG Oral Tab Take 1 tablet (50 mg total) by mouth nightly. Take 50mg (1 tablet) by mouth nightly, and 1 tablet of 25mg fluvoxamine by mouth every morning.   4/27/2025 at 11:00 PM    gabapentin 400 MG Oral Cap Take 1 capsule (400 mg total) by mouth 3 (three) times daily.   4/28/2025 at  8:00 AM    fluvoxaMINE Maleate 25 MG Oral Tab Take 1 tablet (25 mg total) by mouth every morning. Take 25mg (1 tablet) by mouth every morning, and 1 tablet of 50mg fluvoxamine by mouth nightly.   4/28/2025    Propranolol HCl 10 MG Oral Tab Take 1 tablet (10 mg total) by mouth daily as needed (anxiety).   Past Week    modafinil 200 MG Oral Tab Take 1 tablet (200 mg total) by mouth daily. 30 tablet 0 4/28/2025 at  8:00 AM   [5]   Patient Active Problem List  Diagnosis    Vasomotor rhinitis    Chronic back pain    Asthma (HCC)    Thyroid cyst    Non compliance w medication regimen    Suicidal ideation    Anxiety and depression    Schizoaffective disorder (HCC)    Bipolar disorder, now depressed (HCC)    Acne comedone    Hypertriglyceridemia    Chronic arthralgias of knees and hips, unspecified laterality    Generalized anxiety disorder    Hallucinations    Closed fracture of multiple ribs of right side, initial encounter    Traumatic pneumothorax, initial encounter    Alcoholic intoxication without complication    Closed fracture of multiple ribs of right side with routine healing, subsequent encounter

## 2025-04-30 ENCOUNTER — APPOINTMENT (OUTPATIENT)
Dept: GENERAL RADIOLOGY | Facility: HOSPITAL | Age: 25
End: 2025-04-30
Attending: INTERNAL MEDICINE
Payer: MEDICAID

## 2025-04-30 VITALS
BODY MASS INDEX: 18.61 KG/M2 | HEART RATE: 69 BPM | TEMPERATURE: 98 F | DIASTOLIC BLOOD PRESSURE: 59 MMHG | RESPIRATION RATE: 18 BRPM | HEIGHT: 63 IN | SYSTOLIC BLOOD PRESSURE: 99 MMHG | WEIGHT: 105 LBS | OXYGEN SATURATION: 99 %

## 2025-04-30 PROBLEM — S27.0XXA PNEUMOTHORAX, TRAUMATIC: Status: ACTIVE | Noted: 2025-04-24

## 2025-04-30 PROCEDURE — 71045 X-RAY EXAM CHEST 1 VIEW: CPT | Performed by: INTERNAL MEDICINE

## 2025-04-30 PROCEDURE — 99239 HOSP IP/OBS DSCHRG MGMT >30: CPT | Performed by: HOSPITALIST

## 2025-04-30 PROCEDURE — 99232 SBSQ HOSP IP/OBS MODERATE 35: CPT | Performed by: INTERNAL MEDICINE

## 2025-04-30 NOTE — PLAN OF CARE
Pt is a&ox4. Glasses. 6-8L per pulm. RA is baseline. NSR, ST on tele. Refusing heparin. Voids per BRP, up ad brandon. Tolerating regular diet. Pt updated on poc. No further needs at this time.    Problem: Patient/Family Goals  Goal: Patient/Family Long Term Goal  Description: Patient's Long Term Goal: Discharge with adequate resources    Interventions:  - Identify barriers to discharge w/pt and caregiver  - Include patient/family/discharge partner in discharge planning  - Arrange for needed discharge resources and transportation as appropriate  - Identify discharge learning needs   - Consider post-discharge preferences of patient/family/discharge partner  - Assess patient's ability to be responsible for managing their own health  - Refer to Case Management Department for coordinating discharge planning if the patient needs post-hospital services   - See additional Care Plan goals for specific interventions  Outcome: Progressing  Goal: Patient/Family Short Term Goal  Description: Patient's Short Term Goal:   4/28: pulm eval  4/29 noc; sleep well overnight    Interventions:   - See additional Care Plan goals for specific interventions  Outcome: Progressing

## 2025-04-30 NOTE — CM/SW NOTE
04/30/25 1100   CM/SW Referral Data   Referral Source Social Work (self-referral)   Reason for Referral Discharge planning;Other  (SDOH)   Informant Patient;EMR;Clinical Staff Member   Medical Hx   Does patient have an established PCP? Yes   Patient Info   Patient's Current Mental Status at Time of Assessment Alert;Oriented   Patient's Home Environment House   Patient lives with Parent(s)   Discharge Needs   Anticipated D/C needs To be determined     Patient is a 23 y/o female who admitted with Traumatic pneumothorax, Closed fracture of multiple ribs of right side with routine healing.  SW acknowledged order for SDOH for food and transportation insecurities.    Met with patient, who was alert and oriented, to complete assessment. She lives at home with her mother in [place]. She has a cat as well. She confirmed PCP Dr. Baez. She sees a psychiatrist and starting with a new therapist on May 8th. She met with psych liaison to discuss anxiety/depression prior to this SW entering room. Discussed SDOH, she and her mother are able to afford utilities/bills/food. Her mother has food stamps, encouraged she see if she is eligible for them as well. Encouraged her to reach out to her [place] Medicaid member services to request CM and/or request to find out if she is eligible for food and transportation resources. She does not drive. She utilities friends/family and [employer]. She plans to follow up with her insurance. She states she is unable to work. She has attempted to apply for disability but has been denied, encourage her to keep trying periodically to see if anything changes. Added resources to her AVS as well.     SW/CM to remain available for support and/or discharge planning.    Kaila JACOBS LCSW  Discharge Planner

## 2025-04-30 NOTE — PLAN OF CARE
Problem: Pneumothorax   Data: Ax04. Telemetry sinus rhythm. ST with activity. Asymptomatic. Placed on 6 L HF. Afebrile.Acute pain on right rib cage, lidocaine patch applied. Voids. Regular diet.  Refusing heparin. Frequent ambulation.   Intervention: Luvox, lidocaine patch, gabapentin   Education: high flow 02, meds  Response: cooperative with care      Problem: Patient/Family Goals  Goal: Patient/Family Long Term Goal  Description: Patient's Long Term Goal: Discharge with adequate resources    Interventions:  - Identify barriers to discharge w/pt and caregiver  - Include patient/family/discharge partner in discharge planning  - Arrange for needed discharge resources and transportation as appropriate  - Identify discharge learning needs   - Consider post-discharge preferences of patient/family/discharge partner  - Assess patient's ability to be responsible for managing their own health  - Refer to Case Management Department for coordinating discharge planning if the patient needs post-hospital services   - See additional Care Plan goals for specific interventions  Outcome: Progressing  Goal: Patient/Family Short Term Goal  Description: Patient's Short Term Goal:   4/28: pulm eval  4/29 noc; sleep well overnight    Interventions:   - See additional Care Plan goals for specific interventions  Outcome: Progressing     Problem: RESPIRATORY - ADULT  Goal: Achieves optimal ventilation and oxygenation  Description: INTERVENTIONS:- Assess for changes in respiratory status- Assess for changes in mentation and behavior- Position to facilitate oxygenation and minimize respiratory effort- Oxygen supplementation based on oxygen saturation or ABGs- Provide Smoking Cessation handout, if applicable- Encourage broncho-pulmonary hygiene including cough, deep breathe, Incentive Spirometry- Assess the need for suctioning and perform as needed- Assess and instruct to report SOB or any respiratory difficulty- Respiratory Therapy support  as indicated- Manage/alleviate anxiety- Monitor for signs/symptoms of CO2 retention  Outcome: Progressing

## 2025-04-30 NOTE — PROGRESS NOTES
[facility] Pulmonary Progress Note    Shayna Oliver Patient Status:  Observation    10/4/2000 MRN [id number]   Location [facility] 5NW-A Attending Valentin Ly MD   Hosp Day # 0 PCP SPARKLE CAMPBELL     Subjective:  Overnight: No acute events overnight. Afebrile. On 6L NC for pneumothorax.   Overall thinks her breathing is tremendously better denies specific chest pain  Up walking the halls without limitation  Denies any specific chest pain still notes a feeling of cold in the right lower chest  Shortness of breath improving    Objective:  /63 (BP Location: Left arm)   Pulse 69   Temp 97.7 °F (36.5 °C) (Oral)   Resp 18   Ht 5' 3\" (1.6 m)   Wt 105 lb (47.6 kg)   LMP 2025 (Approximate)   SpO2 100%   BMI 18.60 kg/m²     Temp (24hrs), Av °F (36.7 °C), Min:97.7 °F (36.5 °C), Max:98.3 °F (36.8 °C)      Intake/Output:    Intake/Output Summary (Last 24 hours) at 2025 0955  Last data filed at 2025 2100  Gross per 24 hour   Intake 240 ml   Output --   Net 240 ml       Physical Exam:   General: alert, cooperative, oriented.  No respiratory distress.   Head: Normocephalic, without obvious abnormality, atraumatic.   Throat: Lips, mucosa, and tongue normal.  No thrush noted.   Neck: trachea midline, no adenopathy, no thyromegaly. No JVD.   Lungs: clear to auscultation bilaterally diminished   Chest wall: No tenderness or deformity.   Heart: regular rate and rhythm, S1, S2 normal, no murmur, click, rub or gallop   Abdomen: soft, non-distended, no masses, no guarding, no     Rebound.   Extremity: No edema or cyanosis   Skin: No rashes or lesions.   Neurological: Alert, interactive, no focal deficits    Lab Data Review:  Recent Labs     25  0653   WBC 5.0   HGB 12.1   .0     Recent Labs     25  0727      K 3.9      CO2 23.0   BUN 15   CREATSERUM 0.77     No results for input(s): \"PTP\", \"INR\", \"PTT\" in the last 168 hours.    Cultures:   No results found for  this visit on 04/28/25.    Radiology:  XR CHEST AP PORTABLE  (CPT=71045)  Narrative: PROCEDURE:  XR CHEST AP PORTABLE  (CPT=71045)     TECHNIQUE:  AP chest radiograph was obtained.     COMPARISON:  [facility] , XR, XR CHEST AP PORTABLE  (CPT=71045), 4/29/2025, 5:10 AM.  [facility] , XR, XR CHEST PA + LAT CHEST (CPT=71020), 4/25/2017, 11:15 PM.     INDICATIONS:  Dyspnea     PATIENT STATED HISTORY: (As transcribed by Technologist)  Patient offered no additional history at this time.          FINDINGS:  Cardiac size and pulmonary vasculature are within normal limits. No pleural effusions.  Decrease in right apical pneumothorax now measuring 7 mm in diameter.                      Impression: CONCLUSION:  Decrease in right apical pneumothorax since prior examination with small residual pneumothorax remaining..        LOCATION:  [id number]                 Dictated by (CST): Mimi Bloom MD on 4/30/2025 at 6:42 AM       Finalized by (CST): Mimi Bloom MD on 4/30/2025 at 6:43 AM         Medications reviewed     Assessment:  Traumatic rib fractures  Traumatic pneumothorax-slight increase from 4/23 by CT-resolving now 7 mm  History of idiopathic hypersomnia-recommend she follow-up with our sleep physician as there are new medications available     Plan:  Continue oxygen therapy fro reabsorption of pneumothorax   CXR 7mm, decreased size of pneumothorax  Okay for discharge from pulmonary standpoint  Will f/u with CXR as outpatient in 2 weeks  Will check alpha-1     Discussed with patient, Eugenie Starkey and Dr Das    Is this a shared or split note between Advanced Practice Provider and Physician? Yes   ZURI Wetzel  [facility] Pulmonary Medicine  Office: [phone]    Patient seen and examined and agree with the above traumatic pneumothorax continues to resolve without evidence of worsening  Okay to home from pulmonary standpoint  Alpha-1 was checked/pending  To follow-up with pulmonary as well as  sleep    Brittany Mendyvega Das MD
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
## 2025-04-30 NOTE — PROGRESS NOTES
[facility]   part of [facility]     Hospitalist Progress Note     Shayna Oliver Patient Status:  Observation    10/4/2000 MRN [id number]   Location [facility] 5NW-A Attending Valentin Ly MD   Hosp Day # 0 PCP SPARKLE CAMPBELL     Subjective:   Feeling better     Objective:    Review of Systems:   A comprehensive review of systems was completed; pertinent positive and negatives stated in subjective.  Vital signs:  Temp:  [97.8 °F (36.6 °C)-98.3 °F (36.8 °C)] 97.8 °F (36.6 °C)  Pulse:  [] 64  Resp:  [15-19] 18  BP: ()/(53-83) 99/54  SpO2:  [99 %-100 %] 99 %  Physical Exam:    General: No acute distress    Respiratory: no wheezes, no rhonchi  Cardiovascular: S1, S2, RRR  Abdomen: Soft, NT/ND, +BS  Extremities: no edema    Diagnostic Data:    Labs:  Recent Labs   Lab 25  0653   WBC 11.0 5.0   HGB 12.9 12.1   MCV 83.9 83.8   .0 262.0     Recent Labs   Lab 25  22225  0727   * 83   BUN 10 15   CREATSERUM 0.74 0.77   CA 9.1 9.8   ALB 4.8  --     140   K 3.7 3.9    107   CO2 22.0 23.0   ALKPHO 55  --    AST 18  --    ALT 9*  --    BILT 0.2*  --    TP 7.4  --      Estimated Creatinine Clearance: 84.7 mL/min (based on SCr of 0.77 mg/dL).  No results for input(s): \"PTP\", \"INR\" in the last 168 hours.     Microbiology  No results found for this visit on 25.  Imaging: Reviewed in Epic.  Medications: Scheduled Medications[1]    Assessment & Plan:    #increasing right sided pneumothorax (fell on )  -Oxygen via nasal cannulae 7L  -pulm consult  -repeat CXR this AM improving      #right 9th/10th rib fractures from recent mechanical fall    #Anxiety/Depression- psych evaluating      #chronic pain; unclear etiology; has been dealing with this for 10 years  -would consider OP rheum follow up upon discharge.      DC planning once ok with Pulm     Dietitian Malnutrition Assessment    Evaluation for Malnutrition: Criteria for  severe malnutrition diagnosis- acute illness/injury related to Wt loss greater than 7.5% in 3 months., Energy intake less than 50% for greater than 5 days.                 RD Malnutrition Care Plan: Intiated ONS (oral nutritional supplements)., Ordered multivitamin.    Body Fat/Muscle Mass:          Physician Assessment     Patient has a diagnosis of severe malnutrition    Supplementary Documentation:   Quality:  DVT Mechanical Prophylaxis:   SCDs,    DVT Pharmacologic Prophylaxis   Medication    heparin (Porcine) 5000 UNIT/ML injection 5,000 Units                Code Status: Full Code  Ochoa: No urinary catheter in place  Ochoa Duration (in days):   Central line:    DANIELLE:   At this point Ms. Oliver is expected to be discharge to: home     The 21st Century Cures Act makes medical notes like these available to patients in the interest of transparency. Please be advised this is a medical document. Medical documents are intended to carry relevant information, facts as evident, and the clinical opinion of the practitioner. The medical note is intended as peer to peer communication and may appear blunt or direct. It is written in medical language and may contain abbreviations or verbiage that are unfamiliar.                [1]    modafinil  200 mg Oral Daily    multivitamin  1 tablet Oral Daily    heparin  5,000 Units Subcutaneous 2 times per day    fluvoxaMINE  50 mg Oral Nightly    fluvoxaMINE Maleate  25 mg Oral QAM    gabapentin  400 mg Oral TID

## 2025-05-01 NOTE — DISCHARGE SUMMARY
[facility]     Shayna Oliver Patient Status:  Observation    10/4/2000 MRN [id number]   Location [facility] 5NW-A Attending No att. providers found   Hosp Day # 0 PCP SPARKLE CAMPBELL     Date of Admission: 2025  Date of Discharge: 2025    Discharge Disposition: Home or Self Care    Admitting Diagnosis:   Traumatic pneumothorax, initial encounter [S27.0XXA]  Closed fracture of multiple ribs of right side with routine healing, subsequent encounter [S22.41XD]    Hospital Discharge Diagnoses:  #increasing right sided pneumothorax (fell on )  -Oxygen via nasal cannulae 7L  -pulm consult  -repeat CXR this AM improving      #right 9th/10th rib fractures from recent mechanical fall     #Anxiety/Depression- psych evaluated and referrals given     #Severe malnutrition- appreciate dietary eval and recs     #chronic pain; unclear etiology; has been dealing with this for 10 years  -would consider OP rheum follow up upon discharge.      Lace+ Score: 52  59-90 High Risk  29-58 Medium Risk  0-28   Low Risk.     Brief Synopsis: Patient was seen by pulmonology.  Patient was placed on high flow oxygen with serial chest x-rays.  Patient had improvement in symptoms and respiratory status as oxygen was being weaned.  Pain was moderately controlled.  Psychiatry was consulted for major depression.  They had outpatient referral recommendations.  Nutritionist was also consulted for severe malnutrition and recommendations given.  Patient continued to defervesce throughout the hospital course and was cleared by pulmonary service.  Patient discharged in stable condition.    Procedures during hospitalization:   None    Lab/Test results pending at Discharge:   None    Consultants:  Pulmonology, psychiatry    Discharge Medication List:     Discharge Medications        START taking these medications        Instructions Prescription details   lidocaine-menthol 4-1 % Ptch      Place 1 patch onto  the skin daily as needed (apply to affected area; do not apply to open wounds).   Quantity: 30 patch  Refills: 0            CONTINUE taking these medications        Instructions Prescription details   Apri 0.15-30 MG-MCG Tabs  Generic drug: Desogestrel-Ethinyl Estradiol      Take 1 tablet by mouth daily.   Refills: 0     fluvoxaMINE Maleate 25 MG Tabs  Commonly known as: LUVOX      Take 1 tablet (25 mg total) by mouth every morning. Take 25mg (1 tablet) by mouth every morning, and 1 tablet of 50mg fluvoxamine by mouth nightly.   Refills: 0     fluvoxaMINE 50 MG Tabs  Commonly known as: Luvox      Take 1 tablet (50 mg total) by mouth nightly. Take 50mg (1 tablet) by mouth nightly, and 1 tablet of 25mg fluvoxamine by mouth every morning.   Refills: 0     gabapentin 400 MG Caps  Commonly known as: Neurontin      Take 1 capsule (400 mg total) by mouth 3 (three) times daily.   Refills: 0     modafinil 200 MG Tabs  Commonly known as: PROVIGIL      Take 1 tablet (200 mg total) by mouth daily.   Quantity: 30 tablet  Refills: 0     propranolol 10 MG Tabs  Commonly known as: Inderal      Take 1 tablet (10 mg total) by mouth daily as needed (anxiety).   Refills: 0               Where to Get Your Medications        These medications were sent to [facility] [id number] - [place], [place] - [address] [phone], [phone]  [address] [address]      Phone: [phone]   lidocaine-menthol 4-1 % Ptch         Follow-up appointment:   Nina Centeno DO  [address]  [place] [address] [phone]    Schedule an appointment as soon as possible for a visit  sleep assessment    Nilsa Carranza APRN  [address]  [place] [address] [phone]    Call in 2 week(s)  hospital follow up  obtain chest xray before appointment  call central scheduling to schedule xray [phone]    Brittany Das MD  [address]  [place] [address] [phone]    Follow  up  Call with any questions or concerns      -----------------------------------------------------------------------------------------------  PATIENT DISCHARGE INSTRUCTIONS: See electronic chart    Valentin Ly MD 5/1/2025    Time spent: 33 minutes

## 2025-05-05 ENCOUNTER — RESULTS FOLLOW-UP (OUTPATIENT)
Dept: INTERNAL MEDICINE | Age: 25
End: 2025-05-05

## 2025-05-05 ENCOUNTER — IMAGING SERVICES (OUTPATIENT)
Dept: GENERAL RADIOLOGY | Age: 25
End: 2025-05-05
Attending: INTERNAL MEDICINE

## 2025-05-05 ENCOUNTER — WALK IN (OUTPATIENT)
Dept: URGENT CARE | Age: 25
End: 2025-05-05

## 2025-05-05 ENCOUNTER — TELEPHONE (OUTPATIENT)
Dept: INTERNAL MEDICINE | Age: 25
End: 2025-05-05

## 2025-05-05 VITALS
SYSTOLIC BLOOD PRESSURE: 98 MMHG | RESPIRATION RATE: 16 BRPM | TEMPERATURE: 98.7 F | HEART RATE: 94 BPM | OXYGEN SATURATION: 97 % | DIASTOLIC BLOOD PRESSURE: 66 MMHG

## 2025-05-05 DIAGNOSIS — R09.1 PLEURISY: Primary | ICD-10-CM

## 2025-05-05 DIAGNOSIS — S27.0XXD TRAUMATIC PNEUMOTHORAX, SUBSEQUENT ENCOUNTER: ICD-10-CM

## 2025-05-05 LAB — A-1-ANTITRYPSIN: 214 MG/DL

## 2025-05-05 PROCEDURE — 71046 X-RAY EXAM CHEST 2 VIEWS: CPT | Performed by: RADIOLOGY

## 2025-05-05 RX ORDER — PREDNISONE 20 MG/1
40 TABLET ORAL DAILY
Qty: 10 TABLET | Refills: 0 | Status: SHIPPED | OUTPATIENT
Start: 2025-05-05 | End: 2025-05-10

## 2025-05-07 ENCOUNTER — E-ADVICE (OUTPATIENT)
Dept: INTERNAL MEDICINE | Age: 25
End: 2025-05-07

## 2025-05-08 ENCOUNTER — APPOINTMENT (OUTPATIENT)
Dept: BEHAVIORAL HEALTH | Age: 25
End: 2025-05-08

## 2025-05-08 DIAGNOSIS — F33.0 MILD EPISODE OF RECURRENT MAJOR DEPRESSIVE DISORDER (CMD): Primary | ICD-10-CM

## 2025-05-08 DIAGNOSIS — F42.2 MIXED OBSESSIONAL THOUGHTS AND ACTS: ICD-10-CM

## 2025-05-08 DIAGNOSIS — F43.10 PTSD (POST-TRAUMATIC STRESS DISORDER): ICD-10-CM

## 2025-05-08 PROCEDURE — 90791 PSYCH DIAGNOSTIC EVALUATION: CPT | Performed by: PSYCHOLOGIST

## 2025-05-14 ENCOUNTER — HOSPITAL ENCOUNTER (OUTPATIENT)
Dept: GENERAL RADIOLOGY | Facility: HOSPITAL | Age: 25
Discharge: HOME | End: 2025-05-14
Payer: MEDICAID

## 2025-05-14 DIAGNOSIS — S27.0XXA TRAUMATIC PNEUMOTHORAX, INITIAL ENCOUNTER: ICD-10-CM

## 2025-05-14 PROCEDURE — 71046 X-RAY EXAM CHEST 2 VIEWS: CPT

## 2025-05-18 DIAGNOSIS — G47.11 IDIOPATHIC HYPERSOMNIA: ICD-10-CM

## 2025-05-18 DIAGNOSIS — N92.6 IRREGULAR PERIODS: ICD-10-CM

## 2025-05-18 DIAGNOSIS — Z30.011 INITIATION OF OCP (BCP): ICD-10-CM

## 2025-05-18 DIAGNOSIS — N94.6 DYSMENORRHEA: ICD-10-CM

## 2025-05-18 RX ORDER — DESOGESTREL AND ETHINYL ESTRADIOL 0.15-0.03
1 KIT ORAL DAILY
Qty: 84 TABLET | Refills: 3 | Status: CANCELLED | OUTPATIENT
Start: 2025-05-18

## 2025-05-20 ENCOUNTER — E-ADVICE (OUTPATIENT)
Dept: BEHAVIORAL HEALTH | Age: 25
End: 2025-05-20

## 2025-05-20 RX ORDER — MODAFINIL 200 MG/1
200 TABLET ORAL DAILY
Qty: 30 TABLET | Refills: 0 | Status: SHIPPED | OUTPATIENT
Start: 2025-05-20

## 2025-05-21 ENCOUNTER — BEHAVIORAL HEALTH (OUTPATIENT)
Age: 25
End: 2025-05-21

## 2025-05-21 DIAGNOSIS — F42.2 MIXED OBSESSIONAL THOUGHTS AND ACTS: ICD-10-CM

## 2025-05-21 DIAGNOSIS — F33.0 MAJOR DEPRESSIVE DISORDER, RECURRENT EPISODE, MILD (CMD): ICD-10-CM

## 2025-05-21 DIAGNOSIS — G47.11 IDIOPATHIC HYPERSOMNIA: ICD-10-CM

## 2025-05-21 DIAGNOSIS — F41.1 GENERALIZED ANXIETY DISORDER: Primary | ICD-10-CM

## 2025-05-21 PROCEDURE — 99214 OFFICE O/P EST MOD 30 MIN: CPT | Performed by: PSYCHIATRY & NEUROLOGY

## 2025-05-21 RX ORDER — GABAPENTIN 400 MG/1
400 CAPSULE ORAL 3 TIMES DAILY
Qty: 270 CAPSULE | Refills: 0 | Status: SHIPPED | OUTPATIENT
Start: 2025-05-21

## 2025-05-21 RX ORDER — FLUVOXAMINE MALEATE 50 MG
50 TABLET ORAL NIGHTLY
Qty: 90 TABLET | Refills: 0 | Status: SHIPPED | OUTPATIENT
Start: 2025-05-21

## 2025-05-21 RX ORDER — FLUVOXAMINE MALEATE 25 MG
25 TABLET ORAL EVERY MORNING
Qty: 90 TABLET | Refills: 0 | Status: SHIPPED | OUTPATIENT
Start: 2025-05-21

## 2025-05-29 ENCOUNTER — APPOINTMENT (OUTPATIENT)
Dept: BEHAVIORAL HEALTH | Age: 25
End: 2025-05-29

## 2025-05-29 DIAGNOSIS — F42.2 MIXED OBSESSIONAL THOUGHTS AND ACTS: Primary | ICD-10-CM

## 2025-05-29 DIAGNOSIS — F43.10 PTSD (POST-TRAUMATIC STRESS DISORDER): ICD-10-CM

## 2025-05-29 DIAGNOSIS — F33.0 MAJOR DEPRESSIVE DISORDER, RECURRENT EPISODE, MILD (CMD): ICD-10-CM

## 2025-05-29 PROCEDURE — 90837 PSYTX W PT 60 MINUTES: CPT | Performed by: PSYCHOLOGIST

## 2025-05-30 ENCOUNTER — APPOINTMENT (OUTPATIENT)
Dept: INTERNAL MEDICINE | Age: 25
End: 2025-05-30

## 2025-06-01 ENCOUNTER — E-ADVICE (OUTPATIENT)
Dept: BEHAVIORAL HEALTH | Age: 25
End: 2025-06-01

## 2025-06-18 ENCOUNTER — APPOINTMENT (OUTPATIENT)
Dept: BEHAVIORAL HEALTH | Age: 25
End: 2025-06-18

## 2025-06-18 DIAGNOSIS — F42.2 MIXED OBSESSIONAL THOUGHTS AND ACTS: ICD-10-CM

## 2025-06-18 DIAGNOSIS — G47.11 IDIOPATHIC HYPERSOMNIA: ICD-10-CM

## 2025-06-18 DIAGNOSIS — F43.10 PTSD (POST-TRAUMATIC STRESS DISORDER): Primary | ICD-10-CM

## 2025-06-18 DIAGNOSIS — F33.0 MAJOR DEPRESSIVE DISORDER, RECURRENT EPISODE, MILD (CMD): ICD-10-CM

## 2025-06-18 RX ORDER — MODAFINIL 200 MG/1
200 TABLET ORAL DAILY
Qty: 30 TABLET | Refills: 0 | Status: SHIPPED | OUTPATIENT
Start: 2025-06-18

## 2025-06-19 ENCOUNTER — APPOINTMENT (OUTPATIENT)
Dept: BEHAVIORAL HEALTH | Age: 25
End: 2025-06-19

## 2025-07-03 ENCOUNTER — APPOINTMENT (OUTPATIENT)
Dept: BEHAVIORAL HEALTH | Age: 25
End: 2025-07-03

## 2025-07-03 DIAGNOSIS — F43.10 PTSD (POST-TRAUMATIC STRESS DISORDER): Primary | ICD-10-CM

## 2025-07-03 DIAGNOSIS — F33.0 MAJOR DEPRESSIVE DISORDER, RECURRENT EPISODE, MILD (CMD): ICD-10-CM

## 2025-07-03 DIAGNOSIS — F42.2 MIXED OBSESSIONAL THOUGHTS AND ACTS: ICD-10-CM

## 2025-07-17 ENCOUNTER — APPOINTMENT (OUTPATIENT)
Age: 25
End: 2025-07-17

## 2025-07-17 ENCOUNTER — TELEPHONE (OUTPATIENT)
Age: 25
End: 2025-07-17

## 2025-07-18 DIAGNOSIS — G47.11 IDIOPATHIC HYPERSOMNIA: ICD-10-CM

## 2025-07-18 RX ORDER — MODAFINIL 200 MG/1
200 TABLET ORAL DAILY
Qty: 30 TABLET | Refills: 0 | Status: SHIPPED | OUTPATIENT
Start: 2025-07-18

## 2025-07-19 ENCOUNTER — APPOINTMENT (OUTPATIENT)
Dept: BEHAVIORAL HEALTH | Age: 25
End: 2025-07-19

## 2025-07-19 DIAGNOSIS — F33.41 RECURRENT MAJOR DEPRESSIVE DISORDER, IN PARTIAL REMISSION (CMD): ICD-10-CM

## 2025-07-19 DIAGNOSIS — F42.2 MIXED OBSESSIONAL THOUGHTS AND ACTS: ICD-10-CM

## 2025-07-19 DIAGNOSIS — F43.10 PTSD (POST-TRAUMATIC STRESS DISORDER): Primary | ICD-10-CM

## 2025-07-22 ENCOUNTER — E-ADVICE (OUTPATIENT)
Age: 25
End: 2025-07-22

## 2025-07-22 ENCOUNTER — APPOINTMENT (OUTPATIENT)
Age: 25
End: 2025-07-22

## 2025-07-22 DIAGNOSIS — F42.2 MIXED OBSESSIONAL THOUGHTS AND ACTS: ICD-10-CM

## 2025-07-22 DIAGNOSIS — F33.0 MAJOR DEPRESSIVE DISORDER, RECURRENT EPISODE, MILD (CMD): ICD-10-CM

## 2025-07-22 DIAGNOSIS — F41.1 GENERALIZED ANXIETY DISORDER: Primary | ICD-10-CM

## 2025-07-22 DIAGNOSIS — G47.11 IDIOPATHIC HYPERSOMNIA: ICD-10-CM

## 2025-07-22 DIAGNOSIS — F12.90 CANNABIS USE, UNCOMPLICATED: ICD-10-CM

## 2025-07-22 PROCEDURE — 99214 OFFICE O/P EST MOD 30 MIN: CPT | Performed by: PSYCHIATRY & NEUROLOGY

## 2025-07-22 RX ORDER — GABAPENTIN 400 MG/1
400 CAPSULE ORAL 3 TIMES DAILY
Qty: 270 CAPSULE | Refills: 0 | Status: SHIPPED | OUTPATIENT
Start: 2025-07-22

## 2025-07-22 RX ORDER — FLUVOXAMINE MALEATE 25 MG
25 TABLET ORAL EVERY MORNING
Qty: 90 TABLET | Refills: 0 | Status: SHIPPED | OUTPATIENT
Start: 2025-07-22

## 2025-07-22 RX ORDER — FLUVOXAMINE MALEATE 50 MG
50 TABLET ORAL NIGHTLY
Qty: 90 TABLET | Refills: 0 | Status: SHIPPED | OUTPATIENT
Start: 2025-07-22

## 2025-08-05 ENCOUNTER — APPOINTMENT (OUTPATIENT)
Dept: BEHAVIORAL HEALTH | Age: 25
End: 2025-08-05

## 2025-08-05 DIAGNOSIS — F42.2 MIXED OBSESSIONAL THOUGHTS AND ACTS: ICD-10-CM

## 2025-08-05 DIAGNOSIS — F43.10 PTSD (POST-TRAUMATIC STRESS DISORDER): Primary | ICD-10-CM

## 2025-08-05 DIAGNOSIS — F33.0 MILD EPISODE OF RECURRENT MAJOR DEPRESSIVE DISORDER (CMD): ICD-10-CM

## 2025-08-05 PROCEDURE — 90837 PSYTX W PT 60 MINUTES: CPT | Performed by: PSYCHOLOGIST

## 2025-08-12 DIAGNOSIS — N94.6 DYSMENORRHEA: ICD-10-CM

## 2025-08-12 DIAGNOSIS — N92.6 IRREGULAR PERIODS: ICD-10-CM

## 2025-08-12 DIAGNOSIS — Z30.011 INITIATION OF OCP (BCP): ICD-10-CM

## 2025-08-12 RX ORDER — DESOGESTREL AND ETHINYL ESTRADIOL 0.15-0.03
1 KIT ORAL DAILY
Qty: 84 TABLET | Refills: 3 | Status: CANCELLED | OUTPATIENT
Start: 2025-08-12

## 2025-08-14 ENCOUNTER — APPOINTMENT (OUTPATIENT)
Dept: DERMATOLOGY | Age: 25
End: 2025-08-14

## 2025-08-14 ENCOUNTER — TELEPHONE (OUTPATIENT)
Dept: DERMATOLOGY | Age: 25
End: 2025-08-14

## 2025-08-14 DIAGNOSIS — L21.9 SEBORRHEIC DERMATITIS: Primary | ICD-10-CM

## 2025-08-14 DIAGNOSIS — B35.3 TINEA PEDIS OF BOTH FEET: ICD-10-CM

## 2025-08-14 PROCEDURE — 99204 OFFICE O/P NEW MOD 45 MIN: CPT | Performed by: PHYSICIAN ASSISTANT

## 2025-08-14 RX ORDER — CLOTRIMAZOLE AND BETAMETHASONE DIPROPIONATE 10; .64 MG/G; MG/G
CREAM TOPICAL
Qty: 45 G | Refills: 1 | Status: SHIPPED | OUTPATIENT
Start: 2025-08-14

## 2025-08-14 RX ORDER — KETOCONAZOLE 20 MG/ML
SHAMPOO, SUSPENSION TOPICAL
Qty: 120 ML | Refills: 11 | Status: SHIPPED | OUTPATIENT
Start: 2025-08-14

## 2025-08-14 RX ORDER — HYDROCORTISONE 25 MG/G
CREAM TOPICAL
Qty: 60 G | Refills: 2 | Status: SHIPPED | OUTPATIENT
Start: 2025-08-14

## 2025-08-14 RX ORDER — FLUOCINONIDE TOPICAL SOLUTION USP, 0.05% 0.5 MG/ML
SOLUTION TOPICAL
Qty: 120 ML | Refills: 11 | Status: SHIPPED | OUTPATIENT
Start: 2025-08-14

## 2025-08-21 DIAGNOSIS — G47.11 IDIOPATHIC HYPERSOMNIA: ICD-10-CM

## 2025-08-21 RX ORDER — MODAFINIL 200 MG/1
200 TABLET ORAL DAILY
Qty: 30 TABLET | Refills: 0 | Status: SHIPPED | OUTPATIENT
Start: 2025-08-21

## 2025-08-27 ENCOUNTER — TELEPHONE (OUTPATIENT)
Dept: BEHAVIORAL HEALTH | Age: 25
End: 2025-08-27

## 2025-08-27 ENCOUNTER — APPOINTMENT (OUTPATIENT)
Dept: BEHAVIORAL HEALTH | Age: 25
End: 2025-08-27

## 2025-08-27 DIAGNOSIS — F43.10 PTSD (POST-TRAUMATIC STRESS DISORDER): Primary | ICD-10-CM

## 2025-08-27 DIAGNOSIS — F42.2 MIXED OBSESSIONAL THOUGHTS AND ACTS: ICD-10-CM

## 2025-08-27 DIAGNOSIS — F33.0 MILD EPISODE OF RECURRENT MAJOR DEPRESSIVE DISORDER (CMD): ICD-10-CM

## 2025-08-28 ENCOUNTER — APPOINTMENT (OUTPATIENT)
Dept: DERMATOLOGY | Age: 25
End: 2025-08-28
Attending: INTERNAL MEDICINE

## 2025-09-02 ENCOUNTER — APPOINTMENT (OUTPATIENT)
Dept: BEHAVIORAL HEALTH | Age: 25
End: 2025-09-02

## 2025-09-19 ENCOUNTER — APPOINTMENT (OUTPATIENT)
Dept: BEHAVIORAL HEALTH | Age: 25
End: 2025-09-19

## 2025-09-22 ENCOUNTER — APPOINTMENT (OUTPATIENT)
Age: 25
End: 2025-09-22

## 2025-09-25 ENCOUNTER — APPOINTMENT (OUTPATIENT)
Dept: BEHAVIORAL HEALTH | Age: 25
End: 2025-09-25

## 2025-10-10 ENCOUNTER — APPOINTMENT (OUTPATIENT)
Dept: BEHAVIORAL HEALTH | Age: 25
End: 2025-10-10

## (undated) NOTE — ED AVS SNAPSHOT
[facility] Emergency Department    [address] [place] [address]    Phone:  [phone]    Fax:  Peg Mejia   MRN: [id number]    Department:  [facility] Emergency Department   Date of Visit:  4/2 Insurance plans vary and the physician(s) referred by the ER may not be covered by your plan. Please contact your insurance company to determine coverage for follow-up care and referrals.     [facility]  Main [phone]  Pediatric If the emergency physician has read X-rays, these will be re-interpreted by a radiologist.  If there is a significant change in your reading, you will be contacted. Please make sure we have your correct phone number before you leave.  After you leave, you s and ask to get set up for an insurance coverage that is in-network with [place] [address].         Imaging Results         XR CHEST PA + LAT CHEST (CPT=71020) (Final result) Result time:  04/25/17 23:26:12    Final result    Impression:    CONCLUSION:

## (undated) NOTE — ED AVS SNAPSHOT
[facility] Emergency Department    [address] [place] [address]    Phone:  [phone]    Fax:  Peg Mejia   MRN: [id number]    Department:  [facility] Emergency Department   Date of Visit:  4/2 IF THERE IS ANY CHANGE OR WORSENING OF YOUR CONDITION, CALL YOUR PRIMARY CARE PHYSICIAN AT ONCE OR RETURN IMMEDIATELY TO THE EMERGENCY DEPARTMENT.     If you have been prescribed any medication(s), please fill your prescription right away and begin taking t